# Patient Record
Sex: FEMALE | Race: WHITE | Employment: UNEMPLOYED | URBAN - NONMETROPOLITAN AREA
[De-identification: names, ages, dates, MRNs, and addresses within clinical notes are randomized per-mention and may not be internally consistent; named-entity substitution may affect disease eponyms.]

---

## 2017-09-26 ENCOUNTER — HOSPITAL ENCOUNTER (OUTPATIENT)
Age: 42
Discharge: HOME | End: 2017-09-26
Payer: COMMERCIAL

## 2017-09-26 PROCEDURE — 86480 TB TEST CELL IMMUN MEASURE: CPT

## 2017-09-29 LAB
QUANTIFERON (R) TB GOLD (INCUBATED): NEGATIVE
QUANTIFERON MITOGEN MINUS NIL: > 10 IU/ML
QUANTIFERON NIL: 0.05 IU/ML
QUANTIFERON TB AG MINUS NIL: 0 IU/ML (ref 0–0.34)

## 2019-12-13 ENCOUNTER — NURSE ONLY (OUTPATIENT)
Dept: LAB | Age: 44
End: 2019-12-13

## 2019-12-18 LAB
QUANTI TB GOLD PLUS: NEGATIVE
QUANTI TB1 MINUS NIL: 0.03 IU/ML (ref 0–0.34)
QUANTI TB2 MINUS NIL: 0.01 IU/ML (ref 0–0.34)
QUANTIFERON MITOGEN MINUS NIL: 9.27 IU/ML
QUANTIFERON NIL: 0.03 IU/ML

## 2020-10-05 ENCOUNTER — TELEPHONE (OUTPATIENT)
Dept: OBGYN CLINIC | Age: 45
End: 2020-10-05

## 2020-10-05 NOTE — TELEPHONE ENCOUNTER
Lisa calling, stating that she is not a current patient but that she is looking to to transferring. States she sees Dr. Miles Joseph in [place] and her cycles are so bad- heavy and painful, she has tried meds and it does not help. States she was scheduled for hyst when covid started, which then was cancelled. States it was rescheduled in Aug and Sept but both were cancelled due to Dr needing to cancel. It has been a few weeks since the most recent cancellation, she has tried calling and they are not returning her call to reschedule. Her PCP recommended she change providers. Scheduled her with Dr. Jenifer Desai next month for consultation.

## 2020-11-04 ENCOUNTER — OFFICE VISIT (OUTPATIENT)
Dept: OBGYN CLINIC | Age: 45
End: 2020-11-04
Payer: COMMERCIAL

## 2020-11-04 ENCOUNTER — HOSPITAL ENCOUNTER (OUTPATIENT)
Age: 45
Setting detail: SPECIMEN
Discharge: HOME | End: 2020-11-04
Payer: COMMERCIAL

## 2020-11-04 VITALS
SYSTOLIC BLOOD PRESSURE: 116 MMHG | DIASTOLIC BLOOD PRESSURE: 72 MMHG | HEIGHT: 66 IN | BODY MASS INDEX: 34.87 KG/M2 | WEIGHT: 217 LBS

## 2020-11-04 LAB
ABSOLUTE EOS #: 0.28 K/UL (ref 0–0.44)
ABSOLUTE IMMATURE GRANULOCYTE: 0.04 K/UL (ref 0–0.3)
ABSOLUTE LYMPH #: 2.23 K/UL (ref 1.1–3.7)
ABSOLUTE MONO #: 0.6 K/UL (ref 0.1–1.2)
BASOPHILS # BLD: 1 % (ref 0–2)
BASOPHILS ABSOLUTE: 0.06 K/UL (ref 0–0.2)
DIFFERENTIAL TYPE: ABNORMAL
EOSINOPHILS RELATIVE PERCENT: 4 % (ref 1–4)
HCT VFR BLD CALC: 43.2 % (ref 36.3–47.1)
HEMOGLOBIN: 14 G/DL (ref 11.9–15.1)
IMMATURE GRANULOCYTES: 1 %
LYMPHOCYTES # BLD: 32 % (ref 24–43)
MCH RBC QN AUTO: 31.3 PG (ref 25.2–33.5)
MCHC RBC AUTO-ENTMCNC: 32.4 G/DL (ref 28.4–34.8)
MCV RBC AUTO: 96.6 FL (ref 82.6–102.9)
MONOCYTES # BLD: 9 % (ref 3–12)
NRBC AUTOMATED: 0 PER 100 WBC
PDW BLD-RTO: 13.1 % (ref 11.8–14.4)
PLATELET # BLD: 251 K/UL (ref 138–453)
PLATELET ESTIMATE: ABNORMAL
PMV BLD AUTO: 12.6 FL (ref 8.1–13.5)
RBC # BLD: 4.47 M/UL (ref 3.95–5.11)
RBC # BLD: ABNORMAL 10*6/UL
SEG NEUTROPHILS: 53 % (ref 36–65)
SEGMENTED NEUTROPHILS ABSOLUTE COUNT: 3.7 K/UL (ref 1.5–8.1)
TSH SERPL DL<=0.05 MIU/L-ACNC: 0.7 MIU/L (ref 0.3–5)
WBC # BLD: 6.9 K/UL (ref 3.5–11.3)
WBC # BLD: ABNORMAL 10*3/UL

## 2020-11-04 PROCEDURE — 36415 COLL VENOUS BLD VENIPUNCTURE: CPT | Performed by: OBSTETRICS & GYNECOLOGY

## 2020-11-04 PROCEDURE — G8427 DOCREV CUR MEDS BY ELIG CLIN: HCPCS | Performed by: OBSTETRICS & GYNECOLOGY

## 2020-11-04 PROCEDURE — 4004F PT TOBACCO SCREEN RCVD TLK: CPT | Performed by: OBSTETRICS & GYNECOLOGY

## 2020-11-04 PROCEDURE — G8484 FLU IMMUNIZE NO ADMIN: HCPCS | Performed by: OBSTETRICS & GYNECOLOGY

## 2020-11-04 PROCEDURE — 99203 OFFICE O/P NEW LOW 30 MIN: CPT | Performed by: OBSTETRICS & GYNECOLOGY

## 2020-11-04 PROCEDURE — G8417 CALC BMI ABV UP PARAM F/U: HCPCS | Performed by: OBSTETRICS & GYNECOLOGY

## 2020-11-04 RX ORDER — HYDROXYZINE PAMOATE 25 MG/1
25 CAPSULE ORAL 3 TIMES DAILY PRN
COMMUNITY
End: 2021-09-16

## 2020-11-04 RX ORDER — PRAZOSIN HYDROCHLORIDE 1 MG/1
1 CAPSULE ORAL NIGHTLY
COMMUNITY
End: 2021-05-19

## 2020-11-04 RX ORDER — FLUOXETINE 10 MG/1
10 CAPSULE ORAL DAILY
COMMUNITY
End: 2020-11-20 | Stop reason: ALTCHOICE

## 2020-11-04 NOTE — PROGRESS NOTES
tsh    DATE OF VISIT:  11/4/20    PATIENT NAME:  Taryn Connors     YOB: 1975    REASON FOR VISIT:    Chief Complaint   Patient presents with    Menstrual Problem     Was scheduled 4 different times for a hyst and her previous GYN cancelled. Is a failed ablation and is miserable. Ablation was 7 years ago and then suddenly it changes and she started having very heavy bleeding and lots of pain. Her first cycle after the ablation, she bled heavy for 2 months and was changing a pad/tampon every 30-60 min.  Pelvic Pain     Having daily pelvic pain and bloating, has a hx of endometriosis, unable to have intercourse due to the severe pain. Her mother passed away 10/15. HISTORY OF PRESENT ILLNESS:  Pt had an ablation 7 years ago for heavy menses. She was to have had a hysterectomy earlier this year but it was rescheduled d/t covid - since then the physicians office had rescheduled her surgery 3 more times - the last time tellin her that he had lost his privileges at his hospital.  At that point she decided to leave his practice. Cycles have become very heavy; sometimes changing a pad every 30 min; sometimes lasting up to 2 mos long; pelvic pain is now daily - pt has hx of endometriosis; wants the hysterectomy - had previously been approved by [facility]      Patient's last menstrual period was 08/31/2020. Vitals:    11/04/20 1305   BP: 116/72   Weight: 217 lb (98.4 kg)   Height: 5' 6\" (1.676 m)     Body mass index is 35.02 kg/m².   Allergies   Allergen Reactions    Bee Venom Swelling    Ciprofloxacin Hives    Darvocet A500 [Propoxyphene N-Acetaminophen]      Hallucinate and sleep     Keflex [Cephalexin] Hives    Pcn [Penicillins] Hives    Zithromax [Azithromycin] Hives    Bactrim [Sulfamethoxazole-Trimethoprim] Hives and Rash    Oxycodone Rash     Current Outpatient Medications   Medication Sig Dispense Refill    FLUoxetine (PROZAC) 10 MG capsule Take 10 mg by mouth daily      hydrOXYzine (VISTARIL) 25 MG capsule Take 25 mg by mouth 3 times daily as needed for Itching      Fluticasone Furoate 50 MCG/ACT AEPB Inhale into the lungs      prazosin (MINIPRESS) 1 MG capsule Take 1 mg by mouth nightly      buPROPion (WELLBUTRIN XL) 300 MG XL tablet Take 300 mg by mouth every morning.  Elastic Bandages & Supports (ADJUSTABLE WRIST BRACE) MISC Wear braces at night. Dx .0 2 each 0    albuterol (PROVENTIL HFA;VENTOLIN HFA) 108 (90 BASE) MCG/ACT inhaler Inhale 2 puffs into the lungs 4 times daily as needed.  cetirizine (ZYRTEC) 10 MG tablet Take 10 mg by mouth daily. No current facility-administered medications for this visit.       Social History     Socioeconomic History    Marital status:      Spouse name: None    Number of children: None    Years of education: None    Highest education level: None   Occupational History    None   Social Needs    Financial resource strain: None    Food insecurity     Worry: None     Inability: None    Transportation needs     Medical: None     Non-medical: None   Tobacco Use    Smoking status: Current Every Day Smoker     Packs/day: 1.00     Years: 10.00     Pack years: 10.00    Smokeless tobacco: Never Used   Substance and Sexual Activity    Alcohol use: Yes     Comment: rarely    Drug use: None    Sexual activity: Not Currently   Lifestyle    Physical activity     Days per week: None     Minutes per session: None    Stress: None   Relationships    Social connections     Talks on phone: None     Gets together: None     Attends Sabianist service: None     Active member of club or organization: None     Attends meetings of clubs or organizations: None     Relationship status: None    Intimate partner violence     Fear of current or ex partner: None     Emotionally abused: None     Physically abused: None     Forced sexual activity: None   Other Topics Concern    None   Social History Narrative    None REVIEW OF SYSTEMS:  Review of Systems   Genitourinary: Positive for dyspareunia, menstrual problem and pelvic pain. PHYSICAL EXAM:  /72   Ht 5' 6\" (1.676 m)   Wt 217 lb (98.4 kg)   LMP 08/31/2020   BMI 35.02 kg/m²   Physical Exam  Constitutional:       Appearance: Normal appearance. HENT:      Head: Normocephalic and atraumatic. Eyes:      Extraocular Movements: Extraocular movements intact. Pupils: Pupils are equal, round, and reactive to light. Neck:      Musculoskeletal: Normal range of motion. Cardiovascular:      Rate and Rhythm: Normal rate. Pulmonary:      Effort: Pulmonary effort is normal.   Musculoskeletal: Normal range of motion. Neurological:      Mental Status: She is alert and oriented to person, place, and time. Skin:     General: Skin is dry. Psychiatric:         Mood and Affect: Mood normal.         Behavior: Behavior normal.         Thought Content: Thought content normal.         Judgment: Judgment normal.         ASSESSMENT:      1. Family history of lupus anticoagulant disorder    2. Menorrhagia with irregular cycle    3. Endometriosis determined by laparoscopy    4. Dysmenorrhea    5. Dyspareunia in female    6. Adenomyosis    7. Fatigue, unspecified type        PLAN:  Orders Placed This Encounter   Procedures    Lupus Anticoagulant    TSH With Reflex Ft4    CBC Auto Differential     Return in about 4 weeks (around 12/2/2020) for BA to coordinate surgery, follow up. Pt aware that we will want her lupus anticoag status before surgery if possible. BA can work on coordinating her surgery if she wishes to proceed.     Electronically signed by oJ Talamantes DO on 11/19/20

## 2020-11-04 NOTE — Clinical Note
I think I gave you this chart already - she wants hyst - being worked up for [address] that she told me runs in her fam

## 2020-11-06 LAB
ANTICARDIOLIPIN IGA ANTIBODY: 2.3 APU
ANTICARDIOLIPIN IGG ANTIBODY: 1.9 GPU
CARDIOLIPIN AB IGM: 24 MPU
DILUTE RUSSELL VIPER VENOM TIME: ABNORMAL
INR BLD: 0.9
LUPUS ANTICOAG: ABNORMAL
PARTIAL THROMBOPLASTIN TIME: 26.7 SEC (ref 20.5–30.5)
PROTHROMBIN TIME: 9.6 SEC (ref 9–12)

## 2020-11-12 ENCOUNTER — TELEPHONE (OUTPATIENT)
Dept: ONCOLOGY | Age: 45
End: 2020-11-12

## 2020-11-12 NOTE — RESULT ENCOUNTER NOTE
Left a message on patient's voicemail of her results and recommendations. Referral to heme entered into Epic, they will call the patient to schedule. Patient to call with any questions/concerns.

## 2020-11-12 NOTE — TELEPHONE ENCOUNTER
Received internal referral from [facility] for pt to see Dr. Selena De Los Santos for Z83.2 (ICD-10-CM) - Family history of lupus anticoagulant disorder   N92.1 (ICD-10-CM) - Menorrhagia with irregular cycle   R53.83 (ICD-10-CM) - Fatigue, unspecified type. LM for pt to call back and schedule consult. Referral is in deferred work-q.

## 2020-11-20 ENCOUNTER — INITIAL CONSULT (OUTPATIENT)
Dept: ONCOLOGY | Age: 45
End: 2020-11-20
Payer: COMMERCIAL

## 2020-11-20 ENCOUNTER — TELEPHONE (OUTPATIENT)
Dept: ONCOLOGY | Age: 45
End: 2020-11-20

## 2020-11-20 VITALS
SYSTOLIC BLOOD PRESSURE: 116 MMHG | HEART RATE: 95 BPM | BODY MASS INDEX: 34.58 KG/M2 | RESPIRATION RATE: 16 BRPM | WEIGHT: 215.2 LBS | DIASTOLIC BLOOD PRESSURE: 76 MMHG | HEIGHT: 66 IN | TEMPERATURE: 98.1 F

## 2020-11-20 PROBLEM — D69.9 BLEEDING TENDENCY (HCC): Status: ACTIVE | Noted: 2020-11-20

## 2020-11-20 PROBLEM — R23.3 EASY BRUISING: Status: ACTIVE | Noted: 2020-11-20

## 2020-11-20 PROBLEM — Z83.2 FAMILY HISTORY OF LUPUS ANTICOAGULANT DISORDER: Status: ACTIVE | Noted: 2020-11-20

## 2020-11-20 PROCEDURE — G8417 CALC BMI ABV UP PARAM F/U: HCPCS | Performed by: INTERNAL MEDICINE

## 2020-11-20 PROCEDURE — G8427 DOCREV CUR MEDS BY ELIG CLIN: HCPCS | Performed by: INTERNAL MEDICINE

## 2020-11-20 PROCEDURE — 99201 HC NEW PT, E/M LEVEL 1: CPT | Performed by: INTERNAL MEDICINE

## 2020-11-20 PROCEDURE — 99245 OFF/OP CONSLTJ NEW/EST HI 55: CPT | Performed by: INTERNAL MEDICINE

## 2020-11-20 PROCEDURE — G8484 FLU IMMUNIZE NO ADMIN: HCPCS | Performed by: INTERNAL MEDICINE

## 2020-11-20 RX ORDER — ESCITALOPRAM OXALATE 10 MG/1
10 TABLET ORAL DAILY
COMMUNITY
End: 2020-12-30 | Stop reason: ALTCHOICE

## 2020-11-20 NOTE — PROGRESS NOTES
_               Ms. Taryn Connors is a very pleasant 39 y.o. female with history of multiple co morbidities as listed. The patient is referred for further evaluation of abnormal lupus anticoagulant prior to planned hysterectomy in January 2021. The patient had family history of lupus anticoagulant. Patient's mother had multiple episodes of thrombosis and she was evaluated and found to have abnormal lupus anticoagulant. Patient herself was tested in 2014 for that disorder and she was normal.  Repeated test November 4, 2020 showed low positive IgM lupus anticoagulant. The patient has history of psoriasis and she is maintained on immunotherapy treatment for the last 4 years. This is controlled. The patient had problem with easy bruising for long duration. She is having heavy menstrual periods with passage of clots. Uterine ablation was attempted but failed. Plan for the patient to have hysterectomy in January. Currently patient has no other source of active bleeding. No GI bleeding. No hematuria. No nosebleed or gum bleed. No fever or infections. No chest pain or shortness of breath. No other complaints. Patient is a smoker on and off. Denies alcohol drinking. PAST MEDICAL HISTORY: has a past medical history of Active smoker, Anxiety, Asthma, Asthma, Balance problem, Bronchitis, Cervical radicular pain, CTS (carpal tunnel syndrome), Depressive disorder, Fall at home, Headache, Migraine, Multiple allergies, Neck pain, Peripheral neuropathy, Pneumonia, and Psoriasis. PAST SURGICAL HISTORY: has a past surgical history that includes Tonsillectomy; Tubal ligation; Cervix lesion destruction; and Tympanostomy tube placement.      CURRENT MEDICATIONS:  has a current medication list which includes the following prescription(s): escitalopram, fluticasone furoate, prazosin, albuterol sulfate hfa, cetirizine, immunotherapy  Low positive IgM lupus anticoagulant. Mother with history of lupus anticoagulant  Tobacco abuse    PLAN: For more than 60 minutes of face to face discussion, I explained to the patient the nature of this problem with bleeding tendency and possible underlying cause. I explained the nature of lupus anticoagulant and possibility of false positive results on the blood testing. I explained the need to confirm diagnosis with repeated lab testing. Lupus anticoagulant test 2014 was normal.  Repeated November 4, 2020 shows low positive for IgM lupus anticoagulant. It will be repeated in about 6 weeks. I am concerned about patient's history with bleeding tendency and easy bruising and previous history of bleeding after carpal tunnel surgery. We will check platelet function test and repeat CBC along with ALDO with reflex in mid December along with repeated lupus anticoagulant. We will make further recommendations in regard to management prior to her planned surgery in January after reviewing the results of this test.  Patient's questions were answered to the best of her satisfaction and she verbalized full understanding and agreement.

## 2020-11-20 NOTE — LETTER
Tonsillectomy; Tubal ligation; Cervix lesion destruction; and Tympanostomy tube placement. CURRENT MEDICATIONS:  has a current medication list which includes the following prescription(s): escitalopram, fluticasone furoate, prazosin, albuterol sulfate hfa, cetirizine, hydroxyzine, and adjustable wrist brace. ALLERGIES:  is allergic to bee venom; ciprofloxacin; darvocet a500 [propoxyphene n-acetaminophen]; keflex [cephalexin]; pcn [penicillins]; zithromax [azithromycin]; bactrim [sulfamethoxazole-trimethoprim]; and oxycodone. FAMILY HISTORY: Father had multiple thrombosis events and lupus anticoagulant. She had multiple aunts and her father's side with lung cancer and colon cancer and breast cancer. Otherwise negative for any hematological or oncological conditions. SOCIAL HISTORY:  reports that she has been smoking. She has a 10.00 pack-year smoking history. She has never used smokeless tobacco. She reports current alcohol use. REVIEW OF SYSTEMS:     · General: Mild weakness but no fatigue. No unanticipated weight loss or decreased appetite. No fever or chills. · Eyes: No blurred vision, eye pain or double vision. · Ears: No hearing problems or drainage. No tinnitus. · Throat: No sore throat, problems with swallowing or dysphagia. · Respiratory: No cough, sputum or hemoptysis. No shortness of breath. No pleuritic chest pain. · Cardiovascular: No chest pain, orthopnea or PND. No lower extremity edema. No palpitation. · Gastrointestinal: No problems with swallowing. No abdominal pain or bloating. No nausea or vomiting. No diarrhea or constipation. No GI bleeding. · Genitourinary: No dysuria, hematuria, frequency or urgency. · Musculoskeletal: No muscle aches or pains. No limitation of movement. No back pain. No gait disturbance, No joint complaints. · Dermatologic: No skin rashes or pruritus. No skin lesions or discolorations. · Psychiatric: No depression, anxiety, or stress or signs of schizophrenia. No change in mood or affect. · Hematologic: As above. · Infectious disease: No fever, chills or frequent infections. · Endocrine: No polydipsia or polyuria. No temperature intolerance. · Neurologic: No headaches or dizziness. No weakness or numbness of the extremities. No changes in balance, coordination,  memory, mentation, behavior. · Allergic/Immunologic: No nasal congestion or hives. No repeated infections. PHYSICAL EXAM:  The patient is not in acute distress. Vital signs: Blood pressure 116/76, pulse 95, temperature 98.1 °F (36.7 °C), temperature source Oral, resp. rate 16, height 5' 6\" (1.676 m), weight 215 lb 3.2 oz (97.6 kg).      General appearance - well appearing, not in pain or distress  Mental status - good mood, alert and oriented  Eyes - pupils equal and reactive, extraocular eye movements intact  Ears - bilateral TM's and external ear canals normal  Nose - normal and patent, no erythema, discharge or polyps  Mouth - mucous membranes moist, pharynx normal without lesions  Neck - supple, no significant adenopathy  Lymphatics - no palpable lymphadenopathy, no hepatosplenomegaly  Chest - clear to auscultation, no wheezes, rales or rhonchi, symmetric air entry  Heart - normal rate, regular rhythm, normal S1, S2, no murmurs, rubs, clicks or gallops  Abdomen - soft, nontender, nondistended, no masses or organomegaly  Neurological - alert, oriented, normal speech, no focal findings or movement disorder noted  Musculoskeletal - no joint tenderness, deformity or swelling  Extremities - peripheral pulses normal, no pedal edema, no clubbing or cyanosis  Skin - normal coloration and turgor, no rashes, no suspicious skin lesions noted     Review of Diagnostic data:   Lab Results   Component Value Date    WBC 6.9 11/04/2020    HGB 14.0 11/04/2020    HCT 43.2 11/04/2020    MCV 96.6 11/04/2020     11/04/2020 Chemistry        Component Value Date/Time    BUN 11 06/27/2017 1129    CREATININE 0.7 06/27/2017 1129        Component Value Date/Time    AST 33 06/27/2017 1129    ALT 65 06/27/2017 1129            IMPRESSION:   Bleeding tendency. Easy bruising. Menorrhagia with need for hysterectomy in January 2021. Psoriatic arthritis on immunotherapy  Low positive IgM lupus anticoagulant. Mother with history of lupus anticoagulant  Tobacco abuse    PLAN: For more than 60 minutes of face to face discussion, I explained to the patient the nature of this problem with bleeding tendency and possible underlying cause. I explained the nature of lupus anticoagulant and possibility of false positive results on the blood testing. I explained the need to confirm diagnosis with repeated lab testing. Lupus anticoagulant test 2014 was normal.  Repeated November 4, 2020 shows low positive for IgM lupus anticoagulant. It will be repeated in about 6 weeks. I am concerned about patient's history with bleeding tendency and easy bruising and previous history of bleeding after carpal tunnel surgery. We will check platelet function test and repeat CBC along with ALDO with reflex in mid December along with repeated lupus anticoagulant. We will make further recommendations in regard to management prior to her planned surgery in January after reviewing the results of this test.  Patient's questions were answered to the best of her satisfaction and she verbalized full understanding and agreement. If you have questions, please do not hesitate to call me. I look forward to following Jodi Amaya along with you.     Sincerely,                            [facility]                              This note is created with the assistance of a speech recognition program.  While intending to generate a document that actually reflects the content of the visit, the document can still have some errors including those of syntax and sound a like substitutions which may escape proof reading. It such instances, actual meaning can be extrapolated by contextual diversion.

## 2020-11-30 NOTE — PROGRESS NOTES
Patient is scheduled for a RA TLH, monica HOPPER, monica Rome at [facility] on 1/21/21. She is aware that I will have a nurse call her to complete a phone interview and arrange COVID testing. She will come in to see Dr. Joanne Downs prior to surgery and will get labs at that visit. We will also follow up 2 and 6 weeks after surgery. Patient will call back to schedule.

## 2020-12-30 RX ORDER — PAROXETINE 10 MG/1
10 TABLET, FILM COATED ORAL NIGHTLY
COMMUNITY
End: 2021-01-20

## 2020-12-30 NOTE — PROGRESS NOTES
Patient instructed on the pre-operative, intra-operative, and post-operative process. Patient instructed on NPO status. Medication instructions and Pre operative instruction sheet reviewed over the phone. Instructed pt to take hydroxyzine with a small sip if water prior to arriving to the hospital the day of surgery if needed. Pt is having covid testing done at [facility].

## 2020-12-31 ENCOUNTER — PRE-PROCEDURE TELEPHONE (OUTPATIENT)
Dept: PREADMISSION TESTING | Age: 45
End: 2020-12-31

## 2020-12-31 NOTE — TELEPHONE ENCOUNTER
Spoke with patient and confirmed a covid swab appt for Jan 6th at 1100. Instructions provided, verbalizes understanding.

## 2021-01-06 ENCOUNTER — HOSPITAL ENCOUNTER (OUTPATIENT)
Dept: PREADMISSION TESTING | Age: 46
Setting detail: SPECIMEN
Discharge: HOME | End: 2021-01-10
Payer: COMMERCIAL

## 2021-01-06 ENCOUNTER — HOSPITAL ENCOUNTER (OUTPATIENT)
Age: 46
Setting detail: SPECIMEN
Discharge: HOME | End: 2021-01-06
Payer: COMMERCIAL

## 2021-01-06 ENCOUNTER — OFFICE VISIT (OUTPATIENT)
Dept: OBGYN CLINIC | Age: 46
End: 2021-01-06
Payer: COMMERCIAL

## 2021-01-06 VITALS
SYSTOLIC BLOOD PRESSURE: 102 MMHG | DIASTOLIC BLOOD PRESSURE: 70 MMHG | BODY MASS INDEX: 35.2 KG/M2 | HEIGHT: 66 IN | WEIGHT: 219 LBS

## 2021-01-06 DIAGNOSIS — N94.6 DYSMENORRHEA: ICD-10-CM

## 2021-01-06 DIAGNOSIS — N92.1 MENORRHAGIA WITH IRREGULAR CYCLE: Primary | ICD-10-CM

## 2021-01-06 DIAGNOSIS — N80.03 ADENOMYOSIS: ICD-10-CM

## 2021-01-06 DIAGNOSIS — N80.9 ENDOMETRIOSIS DETERMINED BY LAPAROSCOPY: ICD-10-CM

## 2021-01-06 DIAGNOSIS — Z11.59 ENCOUNTER FOR SCREENING FOR OTHER VIRAL DISEASES: ICD-10-CM

## 2021-01-06 DIAGNOSIS — N92.1 MENORRHAGIA WITH IRREGULAR CYCLE: ICD-10-CM

## 2021-01-06 LAB
ABSOLUTE EOS #: 0.43 K/UL (ref 0–0.44)
ABSOLUTE IMMATURE GRANULOCYTE: 0.04 K/UL (ref 0–0.3)
ABSOLUTE LYMPH #: 2.39 K/UL (ref 1.1–3.7)
ABSOLUTE MONO #: 0.55 K/UL (ref 0.1–1.2)
BASOPHILS # BLD: 1 % (ref 0–2)
BASOPHILS ABSOLUTE: 0.07 K/UL (ref 0–0.2)
DIFFERENTIAL TYPE: ABNORMAL
EOSINOPHILS RELATIVE PERCENT: 6 % (ref 1–4)
HCG QUALITATIVE: NEGATIVE
HCT VFR BLD CALC: 43.3 % (ref 36.3–47.1)
HEMOGLOBIN: 14.2 G/DL (ref 11.9–15.1)
IMMATURE GRANULOCYTES: 1 %
LYMPHOCYTES # BLD: 34 % (ref 24–43)
MCH RBC QN AUTO: 31.3 PG (ref 25.2–33.5)
MCHC RBC AUTO-ENTMCNC: 32.8 G/DL (ref 28.4–34.8)
MCV RBC AUTO: 95.6 FL (ref 82.6–102.9)
MONOCYTES # BLD: 8 % (ref 3–12)
NRBC AUTOMATED: 0 PER 100 WBC
PDW BLD-RTO: 13.2 % (ref 11.8–14.4)
PLATELET # BLD: 242 K/UL (ref 138–453)
PLATELET ESTIMATE: ABNORMAL
PMV BLD AUTO: 12.8 FL (ref 8.1–13.5)
RBC # BLD: 4.53 M/UL (ref 3.95–5.11)
RBC # BLD: ABNORMAL 10*6/UL
SEG NEUTROPHILS: 50 % (ref 36–65)
SEGMENTED NEUTROPHILS ABSOLUTE COUNT: 3.56 K/UL (ref 1.5–8.1)
WBC # BLD: 7 K/UL (ref 3.5–11.3)
WBC # BLD: ABNORMAL 10*3/UL

## 2021-01-06 PROCEDURE — 99213 OFFICE O/P EST LOW 20 MIN: CPT | Performed by: OBSTETRICS & GYNECOLOGY

## 2021-01-06 PROCEDURE — 4004F PT TOBACCO SCREEN RCVD TLK: CPT | Performed by: OBSTETRICS & GYNECOLOGY

## 2021-01-06 PROCEDURE — G8417 CALC BMI ABV UP PARAM F/U: HCPCS | Performed by: OBSTETRICS & GYNECOLOGY

## 2021-01-06 PROCEDURE — G8427 DOCREV CUR MEDS BY ELIG CLIN: HCPCS | Performed by: OBSTETRICS & GYNECOLOGY

## 2021-01-06 PROCEDURE — G8484 FLU IMMUNIZE NO ADMIN: HCPCS | Performed by: OBSTETRICS & GYNECOLOGY

## 2021-01-06 PROCEDURE — U0003 INFECTIOUS AGENT DETECTION BY NUCLEIC ACID (DNA OR RNA); SEVERE ACUTE RESPIRATORY SYNDROME CORONAVIRUS 2 (SARS-COV-2) (CORONAVIRUS DISEASE [COVID-19]), AMPLIFIED PROBE TECHNIQUE, MAKING USE OF HIGH THROUGHPUT TECHNOLOGIES AS DESCRIBED BY CMS-2020-01-R: HCPCS

## 2021-01-06 RX ORDER — ESOMEPRAZOLE MAGNESIUM 40 MG/1
CAPSULE, DELAYED RELEASE ORAL
COMMUNITY
Start: 2020-12-02

## 2021-01-06 RX ORDER — TOPIRAMATE 25 MG/1
TABLET ORAL
COMMUNITY
Start: 2020-12-22 | End: 2021-05-19

## 2021-01-06 RX ORDER — SECUKINUMAB 150 MG/ML
INJECTION SUBCUTANEOUS
COMMUNITY
Start: 2020-12-28 | End: 2021-05-19

## 2021-01-06 NOTE — PROGRESS NOTES
DATE OF VISIT:  1/6/21    PATIENT NAME:  Jason Duran     YOB: 1975    REASON FOR VISIT:    Chief Complaint   Patient presents with    Pre-op Exam    Menstrual Problem     heavy periods and painful periods. Sometimes changes a pad every 20-30 minutes. has bled for up to 2 months at a time. Has had ablation in the past. Cycles are very irregular. If she is not bleeding she feels very bloated and has pelvic pain. rates pain 9 out of 10 on pain scale. Has days where she wants to curl up and not move. HISTORY OF PRESENT ILLNESS:  States that she saw heme and they were more worried about inc risk of bleeding v clotting; will dose with usu lovenox; pt continues to have heavy and painful menses despite ablation; r/b/a reviewed and consent obtained; pt wants to have ovaries removed - aware that this means she will be menopausal but has fam hx of problems with ovaries and doesn't want to need more surgery      No LMP recorded. Patient has had an ablation. Vitals:    01/06/21 1416   BP: 102/70   Site: Right Upper Arm   Position: Sitting   Weight: 219 lb (99.3 kg)   Height: 5' 6\" (1.676 m)     Body mass index is 35.35 kg/m².   Allergies   Allergen Reactions    Dextromethorphan-Guaifenesin Rash     Other reaction(s): Dizziness    Aleve [Naproxen] Hives     Gets hives from Aleve and Excedrin migraine or any migraine medications but can take other naproxen products    Bee Venom Swelling    Ciprofloxacin Hives    Darvocet A500 [Propoxyphene N-Acetaminophen]      Hallucinate and sleep     Keflex [Cephalexin] Hives    Pcn [Penicillins] Hives    Zithromax [Azithromycin] Hives    Bactrim [Sulfamethoxazole-Trimethoprim] Hives and Rash    Oxycodone Rash     Current Outpatient Medications   Medication Sig Dispense Refill    esomeprazole (NEXIUM) 40 MG delayed release capsule Take 1 capsule by mouth once daily  topiramate (TOPAMAX) 25 MG tablet TAKE 1 TABLET BY MOUTH IN THE MORNING AND 2 IN THE EVENING      COSENTYX, 300 MG DOSE, 150 MG/ML SOSY       PARoxetine (PAXIL) 10 MG tablet Take 10 mg by mouth nightly      hydrOXYzine (VISTARIL) 25 MG capsule Take 25 mg by mouth 3 times daily as needed for Itching      Fluticasone Furoate 50 MCG/ACT AEPB Inhale into the lungs      albuterol (PROVENTIL HFA;VENTOLIN HFA) 108 (90 BASE) MCG/ACT inhaler Inhale 2 puffs into the lungs 4 times daily as needed.  cetirizine (ZYRTEC) 10 MG tablet Take 10 mg by mouth daily.  prazosin (MINIPRESS) 1 MG capsule Take 1 mg by mouth nightly      Elastic Bandages & Supports (ADJUSTABLE WRIST BRACE) MISC Wear braces at night. Dx .0 2 each 0     No current facility-administered medications for this visit.       Social History     Socioeconomic History    Marital status:      Spouse name: Not on file    Number of children: Not on file    Years of education: Not on file    Highest education level: Not on file   Occupational History    Not on file   Social Needs    Financial resource strain: Not on file    Food insecurity     Worry: Not on file     Inability: Not on file    Transportation needs     Medical: Not on file     Non-medical: Not on file   Tobacco Use    Smoking status: Current Every Day Smoker     Packs/day: 1.00     Years: 10.00     Pack years: 10.00    Smokeless tobacco: Never Used   Substance and Sexual Activity    Alcohol use: Yes     Comment: rarely    Drug use: Never    Sexual activity: Not Currently   Lifestyle    Physical activity     Days per week: Not on file     Minutes per session: Not on file    Stress: Not on file   Relationships    Social connections     Talks on phone: Not on file     Gets together: Not on file     Attends Christian service: Not on file     Active member of club or organization: Not on file     Attends meetings of clubs or organizations: Not on file Relationship status: Not on file    Intimate partner violence     Fear of current or ex partner: Not on file     Emotionally abused: Not on file     Physically abused: Not on file     Forced sexual activity: Not on file   Other Topics Concern    Not on file   Social History Narrative    Not on file       REVIEW OF SYSTEMS:  Review of Systems   Constitutional: Negative for chills and fever. Genitourinary: Positive for menstrual problem and pelvic pain. Negative for dysuria. PHYSICAL EXAM:  /70 (Site: Right Upper Arm, Position: Sitting)   Ht 5' 6\" (1.676 m)   Wt 219 lb (99.3 kg)   BMI 35.35 kg/m²   Physical Exam  Constitutional:       Appearance: Normal appearance. HENT:      Head: Normocephalic and atraumatic. Eyes:      Extraocular Movements: Extraocular movements intact. Pupils: Pupils are equal, round, and reactive to light. Neck:      Musculoskeletal: Normal range of motion. Cardiovascular:      Rate and Rhythm: Normal rate. Pulmonary:      Effort: Pulmonary effort is normal.   Musculoskeletal: Normal range of motion. Neurological:      Mental Status: She is alert and oriented to person, place, and time. Skin:     General: Skin is warm and dry. Psychiatric:         Mood and Affect: Mood normal.         Behavior: Behavior normal.         Thought Content: Thought content normal.         Judgment: Judgment normal.         ASSESSMENT:    1. Menorrhagia with irregular cycle    2. Endometriosis determined by laparoscopy    3. Dysmenorrhea    4. Adenomyosis        PLAN:  No orders of the defined types were placed in this encounter. No follow-ups on file.        Electronically signed by Kam Rivera DO on 01/06/21

## 2021-01-08 ENCOUNTER — ANESTHESIA EVENT (OUTPATIENT)
Dept: OPERATING ROOM | Age: 46
End: 2021-01-08
Payer: COMMERCIAL

## 2021-01-08 LAB — SARS-COV-2, NAA: NOT DETECTED

## 2021-01-09 ENCOUNTER — TELEPHONE (OUTPATIENT)
Dept: PRIMARY CARE CLINIC | Age: 46
End: 2021-01-09

## 2021-01-11 ENCOUNTER — HOSPITAL ENCOUNTER (OUTPATIENT)
Age: 46
Setting detail: OUTPATIENT SURGERY
Discharge: HOME | End: 2021-01-11
Attending: OBSTETRICS & GYNECOLOGY | Admitting: OBSTETRICS & GYNECOLOGY
Payer: COMMERCIAL

## 2021-01-11 ENCOUNTER — ANESTHESIA (OUTPATIENT)
Dept: OPERATING ROOM | Age: 46
End: 2021-01-11
Payer: COMMERCIAL

## 2021-01-11 VITALS — SYSTOLIC BLOOD PRESSURE: 125 MMHG | OXYGEN SATURATION: 96 % | DIASTOLIC BLOOD PRESSURE: 105 MMHG

## 2021-01-11 VITALS
RESPIRATION RATE: 16 BRPM | OXYGEN SATURATION: 99 % | HEART RATE: 63 BPM | WEIGHT: 212 LBS | TEMPERATURE: 98.4 F | DIASTOLIC BLOOD PRESSURE: 65 MMHG | SYSTOLIC BLOOD PRESSURE: 118 MMHG | BODY MASS INDEX: 34.07 KG/M2 | HEIGHT: 66 IN

## 2021-01-11 DIAGNOSIS — G89.18 POST-OP PAIN: Primary | ICD-10-CM

## 2021-01-11 PROCEDURE — 6370000000 HC RX 637 (ALT 250 FOR IP): Performed by: OBSTETRICS & GYNECOLOGY

## 2021-01-11 PROCEDURE — 88305 TISSUE EXAM BY PATHOLOGIST: CPT

## 2021-01-11 PROCEDURE — 2580000003 HC RX 258: Performed by: OBSTETRICS & GYNECOLOGY

## 2021-01-11 PROCEDURE — 7100000001 HC PACU RECOVERY - ADDTL 15 MIN: Performed by: OBSTETRICS & GYNECOLOGY

## 2021-01-11 PROCEDURE — 7100000000 HC PACU RECOVERY - FIRST 15 MIN: Performed by: OBSTETRICS & GYNECOLOGY

## 2021-01-11 PROCEDURE — 2500000003 HC RX 250 WO HCPCS: Performed by: NURSE ANESTHETIST, CERTIFIED REGISTERED

## 2021-01-11 PROCEDURE — 2500000003 HC RX 250 WO HCPCS: Performed by: OBSTETRICS & GYNECOLOGY

## 2021-01-11 PROCEDURE — 7100000010 HC PHASE II RECOVERY - FIRST 15 MIN: Performed by: OBSTETRICS & GYNECOLOGY

## 2021-01-11 PROCEDURE — 3700000001 HC ADD 15 MINUTES (ANESTHESIA): Performed by: OBSTETRICS & GYNECOLOGY

## 2021-01-11 PROCEDURE — S2900 ROBOTIC SURGICAL SYSTEM: HCPCS | Performed by: OBSTETRICS & GYNECOLOGY

## 2021-01-11 PROCEDURE — 7100000011 HC PHASE II RECOVERY - ADDTL 15 MIN: Performed by: OBSTETRICS & GYNECOLOGY

## 2021-01-11 PROCEDURE — 3700000000 HC ANESTHESIA ATTENDED CARE: Performed by: OBSTETRICS & GYNECOLOGY

## 2021-01-11 PROCEDURE — 2720000010 HC SURG SUPPLY STERILE: Performed by: OBSTETRICS & GYNECOLOGY

## 2021-01-11 PROCEDURE — 3600000019 HC SURGERY ROBOT ADDTL 15MIN: Performed by: OBSTETRICS & GYNECOLOGY

## 2021-01-11 PROCEDURE — 3600000009 HC SURGERY ROBOT BASE: Performed by: OBSTETRICS & GYNECOLOGY

## 2021-01-11 PROCEDURE — 6360000002 HC RX W HCPCS: Performed by: NURSE ANESTHETIST, CERTIFIED REGISTERED

## 2021-01-11 PROCEDURE — 58571 TLH W/T/O 250 G OR LESS: CPT | Performed by: OBSTETRICS & GYNECOLOGY

## 2021-01-11 PROCEDURE — 2709999900 HC NON-CHARGEABLE SUPPLY: Performed by: OBSTETRICS & GYNECOLOGY

## 2021-01-11 PROCEDURE — 6370000000 HC RX 637 (ALT 250 FOR IP): Performed by: NURSE ANESTHETIST, CERTIFIED REGISTERED

## 2021-01-11 PROCEDURE — 64488 TAP BLOCK BI INJECTION: CPT | Performed by: NURSE ANESTHETIST, CERTIFIED REGISTERED

## 2021-01-11 PROCEDURE — 88307 TISSUE EXAM BY PATHOLOGIST: CPT

## 2021-01-11 PROCEDURE — 6360000002 HC RX W HCPCS: Performed by: OBSTETRICS & GYNECOLOGY

## 2021-01-11 RX ORDER — PROMETHAZINE HYDROCHLORIDE 25 MG/ML
6.25 INJECTION, SOLUTION INTRAMUSCULAR; INTRAVENOUS
Status: DISCONTINUED | OUTPATIENT
Start: 2021-01-11 | End: 2021-01-11 | Stop reason: HOSPADM

## 2021-01-11 RX ORDER — IBUPROFEN 800 MG/1
800 TABLET ORAL EVERY 8 HOURS PRN
Qty: 20 TABLET | Refills: 0 | Status: SHIPPED | OUTPATIENT
Start: 2021-01-11 | End: 2021-06-30

## 2021-01-11 RX ORDER — LABETALOL HYDROCHLORIDE 5 MG/ML
5 INJECTION, SOLUTION INTRAVENOUS EVERY 10 MIN PRN
Status: DISCONTINUED | OUTPATIENT
Start: 2021-01-11 | End: 2021-01-11 | Stop reason: HOSPADM

## 2021-01-11 RX ORDER — LIDOCAINE HYDROCHLORIDE 20 MG/ML
INJECTION, SOLUTION EPIDURAL; INFILTRATION; INTRACAUDAL; PERINEURAL PRN
Status: DISCONTINUED | OUTPATIENT
Start: 2021-01-11 | End: 2021-01-11 | Stop reason: SDUPTHER

## 2021-01-11 RX ORDER — ONDANSETRON 2 MG/ML
INJECTION INTRAMUSCULAR; INTRAVENOUS PRN
Status: DISCONTINUED | OUTPATIENT
Start: 2021-01-11 | End: 2021-01-11 | Stop reason: SDUPTHER

## 2021-01-11 RX ORDER — OXYCODONE HYDROCHLORIDE AND ACETAMINOPHEN 5; 325 MG/1; MG/1
1 TABLET ORAL PRN
Status: COMPLETED | OUTPATIENT
Start: 2021-01-11 | End: 2021-01-11

## 2021-01-11 RX ORDER — CLINDAMYCIN PHOSPHATE 900 MG/50ML
900 INJECTION INTRAVENOUS
Status: COMPLETED | OUTPATIENT
Start: 2021-01-11 | End: 2021-01-11

## 2021-01-11 RX ORDER — SODIUM CHLORIDE 0.9 % (FLUSH) 0.9 %
10 SYRINGE (ML) INJECTION PRN
Status: DISCONTINUED | OUTPATIENT
Start: 2021-01-11 | End: 2021-01-11 | Stop reason: HOSPADM

## 2021-01-11 RX ORDER — HYDRALAZINE HYDROCHLORIDE 20 MG/ML
5 INJECTION INTRAMUSCULAR; INTRAVENOUS EVERY 10 MIN PRN
Status: DISCONTINUED | OUTPATIENT
Start: 2021-01-11 | End: 2021-01-11 | Stop reason: HOSPADM

## 2021-01-11 RX ORDER — KETOROLAC TROMETHAMINE 30 MG/ML
INJECTION, SOLUTION INTRAMUSCULAR; INTRAVENOUS PRN
Status: DISCONTINUED | OUTPATIENT
Start: 2021-01-11 | End: 2021-01-11 | Stop reason: SDUPTHER

## 2021-01-11 RX ORDER — OXYCODONE HYDROCHLORIDE AND ACETAMINOPHEN 5; 325 MG/1; MG/1
2 TABLET ORAL PRN
Status: COMPLETED | OUTPATIENT
Start: 2021-01-11 | End: 2021-01-11

## 2021-01-11 RX ORDER — DEXAMETHASONE SODIUM PHOSPHATE 10 MG/ML
INJECTION INTRAMUSCULAR; INTRAVENOUS PRN
Status: DISCONTINUED | OUTPATIENT
Start: 2021-01-11 | End: 2021-01-11 | Stop reason: SDUPTHER

## 2021-01-11 RX ORDER — DEXAMETHASONE SODIUM PHOSPHATE 4 MG/ML
INJECTION, SOLUTION INTRA-ARTICULAR; INTRALESIONAL; INTRAMUSCULAR; INTRAVENOUS; SOFT TISSUE PRN
Status: DISCONTINUED | OUTPATIENT
Start: 2021-01-11 | End: 2021-01-11 | Stop reason: SDUPTHER

## 2021-01-11 RX ORDER — FENTANYL CITRATE 50 UG/ML
25 INJECTION, SOLUTION INTRAMUSCULAR; INTRAVENOUS EVERY 5 MIN PRN
Status: DISCONTINUED | OUTPATIENT
Start: 2021-01-11 | End: 2021-01-11 | Stop reason: HOSPADM

## 2021-01-11 RX ORDER — ATROPA BELLADONNA AND OPIUM 16.2; 3 MG/1; MG/1
SUPPOSITORY RECTAL PRN
Status: DISCONTINUED | OUTPATIENT
Start: 2021-01-11 | End: 2021-01-11 | Stop reason: SDUPTHER

## 2021-01-11 RX ORDER — DIMENHYDRINATE 50 MG/1
50 TABLET ORAL ONCE
Status: COMPLETED | OUTPATIENT
Start: 2021-01-11 | End: 2021-01-11

## 2021-01-11 RX ORDER — METOCLOPRAMIDE HYDROCHLORIDE 5 MG/ML
10 INJECTION INTRAMUSCULAR; INTRAVENOUS
Status: DISCONTINUED | OUTPATIENT
Start: 2021-01-11 | End: 2021-01-11 | Stop reason: HOSPADM

## 2021-01-11 RX ORDER — PROPOFOL 10 MG/ML
INJECTION, EMULSION INTRAVENOUS PRN
Status: DISCONTINUED | OUTPATIENT
Start: 2021-01-11 | End: 2021-01-11 | Stop reason: SDUPTHER

## 2021-01-11 RX ORDER — SODIUM CHLORIDE, SODIUM LACTATE, POTASSIUM CHLORIDE, CALCIUM CHLORIDE 600; 310; 30; 20 MG/100ML; MG/100ML; MG/100ML; MG/100ML
INJECTION, SOLUTION INTRAVENOUS CONTINUOUS
Status: DISCONTINUED | OUTPATIENT
Start: 2021-01-11 | End: 2021-01-11 | Stop reason: HOSPADM

## 2021-01-11 RX ORDER — FENTANYL CITRATE 50 UG/ML
50 INJECTION, SOLUTION INTRAMUSCULAR; INTRAVENOUS EVERY 5 MIN PRN
Status: DISCONTINUED | OUTPATIENT
Start: 2021-01-11 | End: 2021-01-11 | Stop reason: HOSPADM

## 2021-01-11 RX ORDER — BUPIVACAINE HYDROCHLORIDE 5 MG/ML
INJECTION, SOLUTION EPIDURAL; INTRACAUDAL
Status: COMPLETED | OUTPATIENT
Start: 2021-01-11 | End: 2021-01-11

## 2021-01-11 RX ORDER — SODIUM CHLORIDE 0.9 % (FLUSH) 0.9 %
10 SYRINGE (ML) INJECTION EVERY 12 HOURS SCHEDULED
Status: DISCONTINUED | OUTPATIENT
Start: 2021-01-11 | End: 2021-01-11 | Stop reason: HOSPADM

## 2021-01-11 RX ORDER — FENTANYL CITRATE 50 UG/ML
INJECTION, SOLUTION INTRAMUSCULAR; INTRAVENOUS PRN
Status: DISCONTINUED | OUTPATIENT
Start: 2021-01-11 | End: 2021-01-11 | Stop reason: SDUPTHER

## 2021-01-11 RX ORDER — HYDROCODONE BITARTRATE AND ACETAMINOPHEN 5; 325 MG/1; MG/1
1 TABLET ORAL EVERY 6 HOURS PRN
Qty: 20 TABLET | Refills: 0 | Status: SHIPPED | OUTPATIENT
Start: 2021-01-11 | End: 2021-01-16

## 2021-01-11 RX ORDER — ROCURONIUM BROMIDE 10 MG/ML
INJECTION, SOLUTION INTRAVENOUS PRN
Status: DISCONTINUED | OUTPATIENT
Start: 2021-01-11 | End: 2021-01-11 | Stop reason: SDUPTHER

## 2021-01-11 RX ADMIN — ATROPA BELLADONNA AND OPIUM 30 MG: 16.2; 3 SUPPOSITORY RECTAL at 12:14

## 2021-01-11 RX ADMIN — SODIUM CHLORIDE, POTASSIUM CHLORIDE, SODIUM LACTATE AND CALCIUM CHLORIDE: 600; 310; 30; 20 INJECTION, SOLUTION INTRAVENOUS at 11:28

## 2021-01-11 RX ADMIN — FENTANYL CITRATE 50 MCG: 50 INJECTION, SOLUTION INTRAMUSCULAR; INTRAVENOUS at 12:02

## 2021-01-11 RX ADMIN — CLINDAMYCIN PHOSPHATE 900 MG: 900 INJECTION, SOLUTION INTRAVENOUS at 12:32

## 2021-01-11 RX ADMIN — FENTANYL CITRATE 25 MCG: 50 INJECTION, SOLUTION INTRAMUSCULAR; INTRAVENOUS at 14:20

## 2021-01-11 RX ADMIN — ENOXAPARIN SODIUM 40 MG: 40 INJECTION SUBCUTANEOUS at 11:25

## 2021-01-11 RX ADMIN — ROCURONIUM BROMIDE 50 MG: 10 INJECTION, SOLUTION INTRAVENOUS at 12:04

## 2021-01-11 RX ADMIN — BUPIVACAINE HYDROCHLORIDE 40 ML: 5 INJECTION, SOLUTION EPIDURAL; INTRACAUDAL; PERINEURAL at 11:50

## 2021-01-11 RX ADMIN — DEXAMETHASONE SODIUM PHOSPHATE 4 MG: 4 INJECTION, SOLUTION INTRAMUSCULAR; INTRAVENOUS at 12:15

## 2021-01-11 RX ADMIN — DEXAMETHASONE SODIUM PHOSPHATE 10 MG: 10 INJECTION INTRAMUSCULAR; INTRAVENOUS at 11:50

## 2021-01-11 RX ADMIN — SUGAMMADEX 200 MG: 100 INJECTION, SOLUTION INTRAVENOUS at 13:20

## 2021-01-11 RX ADMIN — ONDANSETRON 4 MG: 2 INJECTION INTRAMUSCULAR; INTRAVENOUS at 12:01

## 2021-01-11 RX ADMIN — PROPOFOL 200 MG: 10 INJECTION, EMULSION INTRAVENOUS at 12:04

## 2021-01-11 RX ADMIN — DIMENHYDRINATE 50 MG: 50 TABLET ORAL at 11:25

## 2021-01-11 RX ADMIN — FENTANYL CITRATE 25 MCG: 50 INJECTION, SOLUTION INTRAMUSCULAR; INTRAVENOUS at 13:24

## 2021-01-11 RX ADMIN — KETOROLAC TROMETHAMINE 30 MG: 30 INJECTION, SOLUTION INTRAMUSCULAR; INTRAVENOUS at 13:20

## 2021-01-11 RX ADMIN — PHENYLEPHRINE HYDROCHLORIDE 100 MCG: 10 INJECTION INTRAVENOUS at 12:23

## 2021-01-11 RX ADMIN — FENTANYL CITRATE 25 MCG: 50 INJECTION, SOLUTION INTRAMUSCULAR; INTRAVENOUS at 12:46

## 2021-01-11 RX ADMIN — OXYCODONE HYDROCHLORIDE AND ACETAMINOPHEN 1 TABLET: 5; 325 TABLET ORAL at 14:42

## 2021-01-11 RX ADMIN — SODIUM CHLORIDE, PRESERVATIVE FREE 40 ML: 5 INJECTION INTRAVENOUS at 11:50

## 2021-01-11 RX ADMIN — SODIUM CHLORIDE, POTASSIUM CHLORIDE, SODIUM LACTATE AND CALCIUM CHLORIDE: 600; 310; 30; 20 INJECTION, SOLUTION INTRAVENOUS at 13:30

## 2021-01-11 RX ADMIN — GENTAMICIN SULFATE 476.4 MG: 40 INJECTION, SOLUTION INTRAMUSCULAR; INTRAVENOUS at 11:32

## 2021-01-11 RX ADMIN — LIDOCAINE HYDROCHLORIDE 100 MG: 20 INJECTION, SOLUTION EPIDURAL; INFILTRATION; INTRACAUDAL; PERINEURAL at 12:02

## 2021-01-11 ASSESSMENT — PAIN DESCRIPTION - ORIENTATION
ORIENTATION: LOWER

## 2021-01-11 ASSESSMENT — PAIN DESCRIPTION - LOCATION
LOCATION: ABDOMEN
LOCATION: GROIN

## 2021-01-11 ASSESSMENT — PAIN DESCRIPTION - ONSET
ONSET: ON-GOING
ONSET: ON-GOING

## 2021-01-11 ASSESSMENT — PAIN DESCRIPTION - FREQUENCY
FREQUENCY: CONTINUOUS

## 2021-01-11 ASSESSMENT — PAIN SCALES - GENERAL
PAINLEVEL_OUTOF10: 5
PAINLEVEL_OUTOF10: 7
PAINLEVEL_OUTOF10: 8
PAINLEVEL_OUTOF10: 7
PAINLEVEL_OUTOF10: 7
PAINLEVEL_OUTOF10: 5
PAINLEVEL_OUTOF10: 8

## 2021-01-11 ASSESSMENT — PAIN DESCRIPTION - PAIN TYPE
TYPE: SURGICAL PAIN
TYPE: SURGICAL PAIN
TYPE: CHRONIC PAIN
TYPE: SURGICAL PAIN

## 2021-01-11 ASSESSMENT — PAIN DESCRIPTION - DESCRIPTORS
DESCRIPTORS: PRESSURE

## 2021-01-11 ASSESSMENT — LIFESTYLE VARIABLES: SMOKING_STATUS: 1

## 2021-01-11 NOTE — ANESTHESIA PRE PROCEDURE
Department of Anesthesiology  Preprocedure Note       Name:  Danna Jaimes   Age:  39 y.o.  :  1975                                          MRN:  [id number]         Date:  2021      Surgeon: Dariusz Corrigan):  Renuka Landry DO    Procedure: Procedure(s): HYSTERECTOMY VAGINAL LAPAROSCOPIC ROBOTIC ASSISTED-POSSIBLE BSO, POSSIBLE LAPAROSCOPIC COLPOPEXY    Medications prior to admission:   Prior to Admission medications    Medication Sig Start Date End Date Taking? Authorizing Provider   esomeprazole (NEXIUM) 40 MG delayed release capsule Take 1 capsule by mouth once daily 20  Yes Historical Provider, MD   topiramate (TOPAMAX) 25 MG tablet TAKE 1 TABLET BY MOUTH IN THE MORNING AND 2 IN THE EVENING 20  Yes Historical Provider, MD   PARoxetine (PAXIL) 10 MG tablet Take 10 mg by mouth nightly   Yes Historical Provider, MD   hydrOXYzine (VISTARIL) 25 MG capsule Take 25 mg by mouth 3 times daily as needed for Itching   Yes Historical Provider, MD   prazosin (MINIPRESS) 1 MG capsule Take 1 mg by mouth nightly   Yes Historical Provider, MD   Elastic Bandages & Supports (ADJUSTABLE WRIST BRACE) MISC Wear braces at night. Dx .0 3/26/14  Yes Jerson Aranda MD   cetirizine (ZYRTEC) 10 MG tablet Take 10 mg by mouth daily. Yes Historical Provider, MD   COSENTYX, 300 MG DOSE, 150 MG/ML SOSY  20   Historical Provider, MD   Fluticasone Furoate 50 MCG/ACT AEPB Inhale into the lungs    Historical Provider, MD   albuterol (PROVENTIL HFA;VENTOLIN HFA) 108 (90 BASE) MCG/ACT inhaler Inhale 2 puffs into the lungs 4 times daily as needed.     Historical Provider, MD       Current medications:    Current Facility-Administered Medications   Medication Dose Route Frequency Provider Last Rate Last Admin    lactated ringers infusion   Intravenous Continuous Renuka Landry  mL/hr at 21 1128 New Bag at 21 1128 Procedure Laterality Date    CARPAL TUNNEL RELEASE Bilateral     CERVIX LESION DESTRUCTION      ENDOMETRIAL ABLATION      TONSILLECTOMY      TUBAL LIGATION      TYMPANOSTOMY TUBE PLACEMENT         Social History:    Social History     Tobacco Use    Smoking status: Current Every Day Smoker     Packs/day: 1.00     Years: 10.00     Pack years: 10.00    Smokeless tobacco: Never Used   Substance Use Topics    Alcohol use: Yes     Comment: rarely                                Ready to quit: Not Answered  Counseling given: Not Answered      Vital Signs (Current):   Vitals:    12/30/20 1140 01/11/21 1115   BP:  112/63   Pulse:  76   Resp:  18   Temp:  36.4 °C (97.6 °F)   TempSrc:  Temporal   SpO2:  98%   Weight: 210 lb (95.3 kg) 212 lb (96.2 kg)   Height: 5' 6\" (1.676 m) 5' 6\" (1.676 m)                                              BP Readings from Last 3 Encounters:   01/11/21 112/63   01/06/21 102/70   11/20/20 116/76       NPO Status: Time of last liquid consumption: 0500(small sip of water)                        Time of last solid consumption: 2230                        Date of last liquid consumption: 01/11/21                        Date of last solid food consumption: 01/10/21    BMI:   Wt Readings from Last 3 Encounters:   01/11/21 212 lb (96.2 kg)   01/06/21 219 lb (99.3 kg)   11/20/20 215 lb 3.2 oz (97.6 kg)     Body mass index is 34.22 kg/m². CBC:   Lab Results   Component Value Date    WBC 7.0 01/06/2021    RBC 4.53 01/06/2021    HGB 14.2 01/06/2021    HCT 43.3 01/06/2021    MCV 95.6 01/06/2021    RDW 13.2 01/06/2021     01/06/2021       CMP:   Lab Results   Component Value Date    BUN 11 06/27/2017    CREATININE 0.7 06/27/2017    LABGLOM >90 06/27/2017    PROT 7.0 03/11/2014    AST 33 06/27/2017    ALT 65 06/27/2017       POC Tests: No results for input(s): POCGLU, POCNA, POCK, POCCL, POCBUN, POCHEMO, POCHCT in the last 72 hours.     Coags:   Lab Results   Component Value Date PROTIME 9.6 11/04/2020    INR 0.9 11/04/2020    APTT 26.7 11/04/2020       HCG (If Applicable): No results found for: PREGTESTUR, PREGSERUM, HCG, HCGQUANT     ABGs: No results found for: PHART, PO2ART, NCU4XDS, HUQ9UMF, BEART, E0QLRXFY     Type & Screen (If Applicable):  No results found for: LABABO, LABRH    Drug/Infectious Status (If Applicable):  No results found for: HIV, HEPCAB    COVID-19 Screening (If Applicable):   Lab Results   Component Value Date    COVID19 Not Detected 01/06/2021         Anesthesia Evaluation     history of anesthetic complications: PONV. Airway: Mallampati: II  TM distance: >3 FB   Neck ROM: full  Mouth opening: > = 3 FB Dental:      Comment: Visible decay on front upper teeth. Pulmonary:normal exam    (+) asthma: current smoker (1 ppd, 30 pack year hx)    (-) recent URI          Patient did not smoke on day of surgery. Cardiovascular:  Exercise tolerance: good (>4 METS),                     Neuro/Psych:   (+) neuromuscular disease (Peripheral neuropathy):, headaches: migraine headaches, psychiatric history:depression/anxiety             GI/Hepatic/Renal:   (+) GERD: well controlled, PUD,           Endo/Other:                     Abdominal:   (+) obese,         Vascular:                                        Anesthesia Plan      regional and general     ASA 3     (Discussed R/B/A of regional aneshteisa for post operative pain control. PT agrees [employer] plan and wishes to proceed. )  Induction: intravenous. MIPS: Postoperative opioids intended and Prophylactic antiemetics administered. Anesthetic plan and risks discussed with patient. Use of blood products discussed with patient whom consented to blood products.                    [address] [facility], APRN - CRNA   1/11/2021

## 2021-01-11 NOTE — ANESTHESIA PROCEDURE NOTES
Peripheral Block    Patient location during procedure: pre-op  Start time: 1/11/2021 11:48 AM  End time: 1/11/2021 11:50 AM  Staffing  Performed: resident/CRNA   Resident/CRNA: JONEL Govea CRNA  Preanesthetic Checklist  Completed: patient identified, IV checked, site marked, risks and benefits discussed, surgical consent, monitors and equipment checked, pre-op evaluation, timeout performed, anesthesia consent given, oxygen available and patient being monitored  Peripheral Block  Patient position: supine  Prep: ChloraPrep  Patient monitoring: continuous pulse ox, frequent blood pressure checks and IV access  Block type: TAP  Laterality: bilateral  Injection technique: single-shot  Guidance: ultrasound guided  Provider prep: mask and sterile gloves  Needle  Needle type: short-bevel   Needle gauge: 20 G  Needle length: 8 cm  Needle localization: anatomical landmarks and ultrasound guidance  Assessment  Injection assessment: negative aspiration for heme and no paresthesia on injection  Slow fractionated injection: yes  Hemodynamics: stable  Medications Administered  Bupivacaine (MARCAINE) PF injection 0.5%, 40 mL  Reason for block: post-op pain management

## 2021-01-11 NOTE — OP NOTE
Operative Note    Preoperative diagnosis:   1. Menorrhagia  2. Dysmenorrhea    Postoperative diagnosis: Same    Procedure:  Robotic-assisted Total Laparoscopic Hysterectomy with Bilateral Salpingo-oophorectomy    Surgeon: Dr. Abiel Bocanegra    Asst.: Pamella/Lito    Anesthesia: Gen. Estimated blood loss: 25 mL    Fluids: LR    Urine: 300 mL of clear urine    Condition: Stable    Complications: None    Findings: The patient had an anteverted uterus which sounded to  8 cm in depth. There was endometriosis noted in the posterior cul de sac. Bilateral ureteral peristalsis was noted throughout the case and after closure of the vaginal cuff.     Specimen removed: Uterus and Bilateral tubes and ovaries Operative note: The patient was taken to the operating room where general anesthesia was obtained without difficulty. She was prepped and draped usual sterile fashion in a dorsal lithotomy position. Timeout was performed. A weighted speculum was placed in the patient's vagina and the anterior lip of the cervix was grasped with a single-tooth tenaculum. The cervix was progressively dilated and the uterus was sounded with the findings noted above. A V care uterine manipulator was then placed. A Calvert catheter was placed and left to gravity drainage. At this point attention was turned to the patient's abdomen where a supraumbilical incision was made with a scalpel. A veress needle was then carefully introduced at a 45° angle while tenting the abdominal wall. Intraabdominal placement was confirmed by use of water-filled syringe and drop in intra-abdominal pressure with insufflation of CO2. Pneumoperitoneum was obtained to half liters of CO2. A robotic port was then placed without difficulty and intra-abdominal placement was confirmed by laparoscopy. Second and third ports were then placed under direct visualization approximately 4 cm inferior and  5 cm lateral to the first.  8 mm robotic ports were placed in these locations. A fourth and final port was placed to the right. We placed a 12 mm step port in this location. At this point the patient was placed into steep Trendelenburg position. The robot was brought into position and docked. The right arm was loaded with the scissors with monopolar cautery. The left side was loaded with the fenestrated bipolar. Attention was then turned to the console portion of the surgery. Beginning with the left cornual region of uterus, the tube and ovarian pedicle were ligated with the fenestrated bipolar and transected with the scissors. Dissection continued along the broad ligament until the round ligament was ligated and transected.   The anterior uterine serosa was then undermined and taken down to free the bladder from the lower uterine segment and cervix. This was done working from the left side to the midline. Attention was then turned to the right cornual region of the uterus and in a similar manner the tube and ovary were ligated and transected. Dissection again continued along the broad ligament until the round ligament was ligated and transected. The remainder of the bladder flap was then taken down. Once the bladder was freed from the lower uterine segment and cervix, the uterine arteries were skeletonized, ligated, and transected bilaterally. Using the scissors, the uterus was amputated just beneath the cervix using the groove of the V care as a guide. The uterus was then withdrawn into the vagina to maintain pneumoperitoneum. Attention was then turned to the tubes and ovaries which were removed by ligating across the infundibulopelvic ligament and along the mesosalpinx with the fenestrated bipolar and serially transecting with the scissors until the tubes and ovaries were freed from the sidewalls. Tubes and ovaries were placed in the vagina. The vaginal cuff was then closed with 0 V loc suture working from right to left. Copious amounts of irrigation were then used to evaluate the cuff and pedicles to assure hemostasis. Bilateral ureteral peristalsis was again noted. At this point the instruments removed from the abdomen. The skin incisions were closed with 4-0 Monocryl in a subcuticular fashion. The uterus and bilateral tubes and ovaries were handed off to pathology. The vaginal cuff was then examined with no evidence of bleeding. The Calvert catheter was removed. Sponge, lap, needle and instrument counts were correct ×2 and the patient was taken to the recovery area in apparently stable condition.

## 2021-01-11 NOTE — PROGRESS NOTES
Pt verbalized readiness to go home. Discharge instructions given to pt and son. Verbalized understanding and all questions answered at this time. Discharge Criteria    Inpatients must meet Criteria 1 through 7. All other patients are either YES or N/A. If a NO is chosen then Anesthesia or Surgeon must be notified. 1.  Minimum 30 minutes after last dose of sedative medication, minimum 120 minutes after last dose of reversal agent. Yes      2. Systolic BP stable within 20 mmHg for 30 minutes & systolic BP between 90 & 885 or within 10 mmHg of baseline. Yes      3. Pulse between 60 and 100 or within 10 bpm of baseline. Yes      4. Spontaneous respiratory rate >/= 10 per minute. Yes      5. SaO2 >/= 95 or  >/= baseline. Yes      6. Able to cough and swallow or return to baseline function. Yes      7. Alert and oriented or return to baseline mental status. Yes      8. Demonstrates controlled, coordinated movements, ambulates with steady gait, or return to baseline activity function. Yes      9. Minimal or no pain or nausea, or at a level tolerable and acceptable to patient. Yes      10. Takes and retains oral fluids as allowed. Yes      11. Procedural / perioperative site stable. Minimal or no bleeding. Yes          12. If GI endoscopy procedure, minimal or no abdominal distention or passing flatus. N/A      13. Written discharge instructions and emergency telephone number provided. Yes      14. Accompanied by a responsible adult.     Yes

## 2021-01-11 NOTE — ANESTHESIA POSTPROCEDURE EVALUATION
Department of Anesthesiology  Postprocedure Note    Patient: Andrew Cisneros  MRN: [id number]  YOB: 1975  Date of evaluation: 1/11/2021  Time:  4:18 PM     Procedure Summary     Date: 01/11/21 Room / Location: [address]    Anesthesia Start: 6011 Anesthesia Stop: 4863    Procedure: HYSTERECTOMY VAGINAL LAPAROSCOPIC ROBOTIC ASSISTED- BSO (N/A ) Diagnosis: (MENORRHAGIA ENDOMETNOSIS)    Surgeons: Grayson Saab DO Responsible Provider: Bonnie Shaffer    Anesthesia Type: regional, general ASA Status: 3          Anesthesia Type: regional, general    Minh Phase I: Minh Score: 10    Minh Phase II: Minh Score: 10    Last vitals: Reviewed and per EMR flowsheets.        Anesthesia Post Evaluation    Patient location during evaluation: PACU  Patient participation: complete - patient participated  Level of consciousness: awake and alert  Pain score: 2  Airway patency: patent  Nausea & Vomiting: no vomiting and no nausea  Complications: no  Cardiovascular status: hemodynamically stable  Respiratory status: spontaneous ventilation and room air  Hydration status: stable

## 2021-01-11 NOTE — PROGRESS NOTES
Pt states her pain is more of a pressure as if she feels like she needs to go to the bathroom. This RN gave pt the option of IV pain medication or option to get up to the bathroom and try to see if she is able to go and maybe that will help. Pt states she would like to try going to the bathroom.

## 2021-01-13 LAB — SURGICAL PATHOLOGY REPORT: NORMAL

## 2021-01-20 ENCOUNTER — OFFICE VISIT (OUTPATIENT)
Dept: OBGYN CLINIC | Age: 46
End: 2021-01-20

## 2021-01-20 VITALS
DIASTOLIC BLOOD PRESSURE: 80 MMHG | HEIGHT: 66 IN | SYSTOLIC BLOOD PRESSURE: 120 MMHG | BODY MASS INDEX: 35.03 KG/M2 | WEIGHT: 218 LBS

## 2021-01-20 DIAGNOSIS — N92.1 MENORRHAGIA WITH IRREGULAR CYCLE: Primary | ICD-10-CM

## 2021-01-20 DIAGNOSIS — N80.9 ENDOMETRIOSIS DETERMINED BY LAPAROSCOPY: ICD-10-CM

## 2021-01-20 PROCEDURE — 99024 POSTOP FOLLOW-UP VISIT: CPT | Performed by: OBSTETRICS & GYNECOLOGY

## 2021-01-20 RX ORDER — VENLAFAXINE HYDROCHLORIDE 37.5 MG/1
CAPSULE, EXTENDED RELEASE ORAL
COMMUNITY
Start: 2021-01-14 | End: 2021-05-19

## 2021-01-20 NOTE — PROGRESS NOTES
 cetirizine (ZYRTEC) 10 MG tablet Take 10 mg by mouth daily.  venlafaxine (EFFEXOR XR) 37.5 MG extended release capsule       ibuprofen (ADVIL;MOTRIN) 800 MG tablet Take 1 tablet by mouth every 8 hours as needed for Pain 20 tablet 0    Elastic Bandages & Supports (ADJUSTABLE WRIST BRACE) MISC Wear braces at night. Dx .0 2 each 0     No current facility-administered medications for this visit. Social History     Socioeconomic History    Marital status:      Spouse name: Not on file    Number of children: Not on file    Years of education: Not on file    Highest education level: Not on file   Occupational History    Not on file   Social Needs    Financial resource strain: Not on file    Food insecurity     Worry: Not on file     Inability: Not on file    Transportation needs     Medical: Not on file     Non-medical: Not on file   Tobacco Use    Smoking status: Current Every Day Smoker     Packs/day: 1.00     Years: 10.00     Pack years: 10.00    Smokeless tobacco: Never Used   Substance and Sexual Activity    Alcohol use: Yes     Comment: rarely    Drug use: Never    Sexual activity: Not Currently   Lifestyle    Physical activity     Days per week: Not on file     Minutes per session: Not on file    Stress: Not on file   Relationships    Social connections     Talks on phone: Not on file     Gets together: Not on file     Attends [employer] service: Not on file     Active member of club or organization: Not on file     Attends meetings of clubs or organizations: Not on file     Relationship status: Not on file    Intimate partner violence     Fear of current or ex partner: Not on file     Emotionally abused: Not on file     Physically abused: Not on file     Forced sexual activity: Not on file   Other Topics Concern    Not on file   Social History Narrative    Not on file       REVIEW OF SYSTEMS:  Review of Systems   Constitutional: Negative for chills and fever. Genitourinary: Negative for dysuria, pelvic pain and vaginal bleeding. PHYSICAL EXAM:  /80 (Site: Right Upper Arm, Position: Sitting)   Ht 5' 6\" (1.676 m)   Wt 218 lb (98.9 kg)   LMP 08/31/2020   BMI 35.19 kg/m²   Physical Exam  Abdominal:      Comments: Inc c/d/i         ASSESSMENT:      1. Menorrhagia with irregular cycle    2. Endometriosis determined by laparoscopy        PLAN:  No orders of the defined types were placed in this encounter. Return in about 4 weeks (around 2/17/2021) for follow up.        Electronically signed by Brennen Melo DO on 01/20/21

## 2021-02-18 ENCOUNTER — OFFICE VISIT (OUTPATIENT)
Dept: OBGYN CLINIC | Age: 46
End: 2021-02-18

## 2021-02-18 VITALS
WEIGHT: 224 LBS | BODY MASS INDEX: 36 KG/M2 | HEIGHT: 66 IN | SYSTOLIC BLOOD PRESSURE: 104 MMHG | DIASTOLIC BLOOD PRESSURE: 82 MMHG

## 2021-02-18 DIAGNOSIS — N80.03 ADENOMYOSIS: ICD-10-CM

## 2021-02-18 DIAGNOSIS — N92.1 MENORRHAGIA WITH IRREGULAR CYCLE: Primary | ICD-10-CM

## 2021-02-18 PROCEDURE — 99024 POSTOP FOLLOW-UP VISIT: CPT | Performed by: OBSTETRICS & GYNECOLOGY

## 2021-02-18 RX ORDER — TRAZODONE HYDROCHLORIDE 50 MG/1
TABLET ORAL
COMMUNITY
Start: 2021-02-11 | End: 2021-05-19

## 2021-02-18 RX ORDER — ESTRADIOL 1 MG/1
1 TABLET ORAL DAILY
Qty: 30 TABLET | Refills: 3 | Status: SHIPPED | OUTPATIENT
Start: 2021-02-18 | End: 2021-05-19

## 2021-02-18 NOTE — PROGRESS NOTES
DATE OF VISIT:  2/18/21    PATIENT NAME:  Nirali Warren     YOB: 1975    REASON FOR VISIT:    Chief Complaint   Patient presents with    Post-Op Check     Pt. had TLH/BSO on 1-    Menopause     Pt. c/o night sweats and insomnia, during the day she does okay. She has trazadone that she has been using to help sleep but it doesn't seem to be sleeping. HISTORY OF PRESENT ILLNESS:  Doing well since surgery - having night sweats and insomnia since surgery - would like to try hrt; no bleeding no pain      Patient's last menstrual period was 08/31/2020. Vitals:    02/18/21 1521   BP: 104/82   Site: Right Upper Arm   Position: Sitting   Weight: 224 lb (101.6 kg)   Height: 5' 6\" (1.676 m)     Body mass index is 36.15 kg/m².   Allergies   Allergen Reactions    Dextromethorphan-Guaifenesin Rash     Other reaction(s): Dizziness    Aleve [Naproxen] Hives     Gets hives from Aleve and Excedrin migraine or any migraine medications but can take other naproxen products    Bee Venom Swelling    Ciprofloxacin Hives    Darvocet A500 [Propoxyphene N-Acetaminophen]      Hallucinate and sleep     Keflex [Cephalexin] Hives    Pcn [Penicillins] Hives    Zithromax [Azithromycin] Hives    Bactrim [Sulfamethoxazole-Trimethoprim] Hives and Rash    Oxycodone Rash     Current Outpatient Medications   Medication Sig Dispense Refill    traZODone (DESYREL) 50 MG tablet TAKE 1 TO 2 TABLETS BY MOUTH AT BEDTIME      venlafaxine (EFFEXOR XR) 37.5 MG extended release capsule       esomeprazole (NEXIUM) 40 MG delayed release capsule Take 1 capsule by mouth once daily      topiramate (TOPAMAX) 25 MG tablet TAKE 1 TABLET BY MOUTH IN THE MORNING AND 2 IN THE EVENING      COSENTYX, 300 MG DOSE, 150 MG/ML SOSY       hydrOXYzine (VISTARIL) 25 MG capsule Take 25 mg by mouth 3 times daily as needed for Itching      Fluticasone Furoate 50 MCG/ACT AEPB Inhale into the lungs  albuterol (PROVENTIL HFA;VENTOLIN HFA) 108 (90 BASE) MCG/ACT inhaler Inhale 2 puffs into the lungs 4 times daily as needed.  cetirizine (ZYRTEC) 10 MG tablet Take 10 mg by mouth daily.  ibuprofen (ADVIL;MOTRIN) 800 MG tablet Take 1 tablet by mouth every 8 hours as needed for Pain 20 tablet 0    prazosin (MINIPRESS) 1 MG capsule Take 1 mg by mouth nightly      Elastic Bandages & Supports (ADJUSTABLE WRIST BRACE) MISC Wear braces at night. Dx .0 2 each 0     No current facility-administered medications for this visit.       Social History     Socioeconomic History    Marital status:      Spouse name: Not on file    Number of children: Not on file    Years of education: Not on file    Highest education level: Not on file   Occupational History    Not on file   Social Needs    Financial resource strain: Not on file    Food insecurity     Worry: Not on file     Inability: Not on file    Transportation needs     Medical: Not on file     Non-medical: Not on file   Tobacco Use    Smoking status: Current Every Day Smoker     Packs/day: 1.00     Years: 10.00     Pack years: 10.00    Smokeless tobacco: Never Used   Substance and Sexual Activity    Alcohol use: Yes     Comment: rarely    Drug use: Never    Sexual activity: Not Currently   Lifestyle    Physical activity     Days per week: Not on file     Minutes per session: Not on file    Stress: Not on file   Relationships    Social connections     Talks on phone: Not on file     Gets together: Not on file     Attends [employer] service: Not on file     Active member of club or organization: Not on file     Attends meetings of clubs or organizations: Not on file     Relationship status: Not on file    Intimate partner violence     Fear of current or ex partner: Not on file     Emotionally abused: Not on file     Physically abused: Not on file     Forced sexual activity: Not on file   Other Topics Concern    Not on file Social History Narrative    Not on file       REVIEW OF SYSTEMS:  Review of Systems   Constitutional: Negative for chills and fever. Genitourinary: Negative for dysuria, pelvic pain, vaginal bleeding and vaginal discharge. Hot flashes but worse night sweats       PHYSICAL EXAM:  /82 (Site: Right Upper Arm, Position: Sitting)   Ht 5' 6\" (1.676 m)   Wt 224 lb (101.6 kg)   LMP 08/31/2020   BMI 36.15 kg/m²   Physical Exam  Constitutional:       Appearance: Normal appearance. Genitourinary:      Vulva normal.      Cervix is absent. Uterus is absent. Genitourinary Comments: Cuff well supported - 2 sutures visible   Abdominal:      General: Abdomen is flat. Palpations: Abdomen is soft. Comments: Inc well healed   Neurological:      Mental Status: She is alert. ASSESSMENT:      1. Menorrhagia with irregular cycle    2. Adenomyosis        PLAN:  No orders of the defined types were placed in this encounter.     Return in about 3 months (around 5/18/2021) for annual.       Electronically signed by Marisol Gonzalez DO on 02/18/21

## 2021-05-19 ENCOUNTER — OFFICE VISIT (OUTPATIENT)
Dept: OBGYN CLINIC | Age: 46
End: 2021-05-19
Payer: COMMERCIAL

## 2021-05-19 VITALS
HEART RATE: 84 BPM | DIASTOLIC BLOOD PRESSURE: 74 MMHG | WEIGHT: 238 LBS | SYSTOLIC BLOOD PRESSURE: 109 MMHG | BODY MASS INDEX: 38.41 KG/M2

## 2021-05-19 DIAGNOSIS — Z01.419 WOMEN'S ANNUAL ROUTINE GYNECOLOGICAL EXAMINATION: Primary | ICD-10-CM

## 2021-05-19 DIAGNOSIS — N95.1 MENOPAUSAL SYMPTOMS: ICD-10-CM

## 2021-05-19 PROCEDURE — 99396 PREV VISIT EST AGE 40-64: CPT | Performed by: OBSTETRICS & GYNECOLOGY

## 2021-05-19 RX ORDER — DOXYCYCLINE HYCLATE 100 MG/1
100 CAPSULE ORAL 2 TIMES DAILY
COMMUNITY
Start: 2021-05-11 | End: 2021-05-21

## 2021-05-19 RX ORDER — ESTERIFIED ESTROGEN AND METHYLTESTOSTERONE 1.25; 2.5 MG/1; MG/1
1 TABLET ORAL DAILY
Qty: 30 TABLET | Refills: 1 | Status: SHIPPED | OUTPATIENT
Start: 2021-05-19 | End: 2021-06-30 | Stop reason: SDUPTHER

## 2021-05-19 RX ORDER — TOPIRAMATE 50 MG/1
50 TABLET, FILM COATED ORAL 2 TIMES DAILY
COMMUNITY
Start: 2021-04-30 | End: 2022-04-04

## 2021-05-19 RX ORDER — SUMATRIPTAN 100 MG/1
100 TABLET, FILM COATED ORAL
COMMUNITY
Start: 2021-04-30 | End: 2022-04-30

## 2021-05-19 ASSESSMENT — ENCOUNTER SYMPTOMS
CONSTIPATION: 0
ABDOMINAL PAIN: 0
DIARRHEA: 0
SHORTNESS OF BREATH: 0

## 2021-05-19 NOTE — PROGRESS NOTES
BSO performed by Khari Littlejohn DO at [facility] Bilateral 01/11/2021    TONSILLECTOMY      TUBAL LIGATION      TYMPANOSTOMY TUBE PLACEMENT       Family History   Problem Relation Age of Onset    Stroke Mother     Hypertension Mother    [place] Elevated Lipids Mother     Cancer Mother         uterine    Lupus Mother     Other Mother         myasthenia gravis    Heart Disease Father     Hypertension Father     Elevated Lipids Father     Seizures Father     Asthma Father     Cancer Father         skin    Heart Disease Other     Stroke Other     Hypertension Other     Elevated Lipids Other     Thyroid Disease Other     Alzheimer's Disease Other     Cancer Other         lung/skin/colon    Mult Sclerosis Other      Social History     Socioeconomic History    Marital status:      Spouse name: Not on file    Number of children: Not on file    Years of education: Not on file    Highest education level: Not on file   Occupational History    Not on file   Tobacco Use    Smoking status: Current Every Day Smoker     Packs/day: 1.00     Years: 10.00     Pack years: 10.00    Smokeless tobacco: Never Used   Vaping Use    Vaping Use: Never used   Substance and Sexual Activity    Alcohol use: Yes     Comment: rarely    Drug use: Never    Sexual activity: Not Currently   Other Topics Concern    Not on file   Social History Narrative    Not on file     Social Determinants of Health     Financial Resource Strain:     Difficulty of Paying Living Expenses:    Food Insecurity:     Worried About Running Out of Food in the Last Year:     [address] in the Last Year:    Transportation Needs:     Lack of Transportation (Medical):      Lack of Transportation (Non-Medical):    Physical Activity:     Days of Exercise per Week:     Minutes of Exercise per Session:    Stress:     Feeling of Stress :    Social Connections:     Frequency of Communication with Friends and Family:     Frequency of Social Gatherings with Friends and Family:     Attends Alevism Services:     Active Member of Clubs or Organizations:     Attends Club or Organization Meetings:     Marital Status:    Intimate Partner Violence:     Fear of Current or Ex-Partner:     Emotionally Abused:     Physically Abused:     Sexually Abused:      Vitals:    05/19/21 1451   BP: 109/74   Site: Left Upper Arm   Position: Sitting   Pulse: 84   Weight: 238 lb (108 kg)     Body mass index is 38.41 kg/m². Patient's last menstrual period was 08/31/2020. MEDICATIONS:  Current Outpatient Medications   Medication Sig Dispense Refill    Secukinumab (COSENTYX SC) Inject into the skin      topiramate (TOPAMAX) 50 MG tablet Take 50 mg by mouth 2 times daily      doxycycline hyclate (VIBRAMYCIN) 100 MG capsule Take 100 mg by mouth 2 times daily      SUMAtriptan (IMITREX) 100 MG tablet Take 100 mg by mouth once as needed      estrogens, conjugated,-methylTESTOSTERone (ESTRATEST) 1.25-2.5 MG per tablet Take 1 tablet by mouth daily. 30 tablet 1    estradiol (ESTRACE) 1 MG tablet Take 1 tablet by mouth daily 30 tablet 3    esomeprazole (NEXIUM) 40 MG delayed release capsule Take 1 capsule by mouth once daily      hydrOXYzine (VISTARIL) 25 MG capsule Take 25 mg by mouth 3 times daily as needed for Itching      Fluticasone Furoate 50 MCG/ACT AEPB Inhale into the lungs      albuterol (PROVENTIL HFA;VENTOLIN HFA) 108 (90 BASE) MCG/ACT inhaler Inhale 2 puffs into the lungs 4 times daily as needed.  cetirizine (ZYRTEC) 10 MG tablet Take 10 mg by mouth daily.       fluocinonide (LIDEX) 0.05 % cream APPLY CREAM EXTERNALLY TWICE DAILY      traZODone (DESYREL) 50 MG tablet TAKE 1 TO 2 TABLETS BY MOUTH AT BEDTIME      venlafaxine (EFFEXOR XR) 37.5 MG extended release capsule       ibuprofen (ADVIL;MOTRIN) 800 MG tablet Take 1 tablet by mouth every 8 hours as needed for Pain 20 tablet 0    Elastic Bandages & Supports (ADJUSTABLE WRIST BRACE) [facility] Wear braces at night. Dx .0 2 each 0     No current facility-administered medications for this visit. ALLERGIES:  Allergies as of 05/19/2021 - Fully Reviewed 05/19/2021   Allergen Reaction Noted    Dextromethorphan-guaifenesin Rash 08/20/2020    Aleve [naproxen] Hives 01/06/2021    Bee venom Swelling 03/07/2014    Ciprofloxacin Hives 03/07/2014    Darvocet a500 [propoxyphene n-acetaminophen]  03/07/2014    Keflex [cephalexin] Hives 03/07/2014    Pcn [penicillins] Hives 03/11/2014    Zithromax [azithromycin] Hives 03/11/2014    Bactrim [sulfamethoxazole-trimethoprim] Hives and Rash 03/07/2014    Oxycodone Rash 03/11/2014           Symptoms of decreased mood absent    **If either question is answered in a  positive fashion then completethe PHQ9 Scoring Evaluation and make the appropriate referral**      Gynecologic History:       Patient's last menstrual period was 08/31/2020. Had hysterectomy    Sexually Active: Yes    STD History: No     Hormone Replacement Exposure: yes      Genetic Qualified Family History of Breast, Ovarian , Colon or Uterine Cancer:No   If YES see scanned worksheet. Preventative Health Testing:  Colposcopy History:   Date of Last Mammogram: due  Date of Last Colonoscopy:   Date of Last Bone Density:      ______________________________________________________________________    REVIEW OF SYSTEMS:       Review of Systems   Constitutional: Negative for chills, fatigue and fever. Respiratory: Negative for shortness of breath. Cardiovascular: Negative for chest pain. Gastrointestinal: Negative for abdominal pain, constipation and diarrhea. Genitourinary: Negative for dysuria, enuresis, frequency, menstrual problem, pelvic pain, urgency and vaginal bleeding. Hot flashes, dec libido   Neurological: Negative for dizziness, light-headedness and headaches.        PHYSICAL EXAM:    OBGyn Exam    POC Cultures:  No results found for this visit on 05/19/21. ASSESSMENT:      39 y.o. Annual  1. Women's annual routine gynecological examination    2. Menopausal symptoms          Patient Active Problem List    Diagnosis Date Noted    Post-op pain     Menorrhagia with regular cycle     Dysmenorrhea     Bleeding tendency ([address] 11/20/2020    Easy bruising 11/20/2020    Family history of lupus anticoagulant disorder 11/20/2020    Depressive disorder     Fall at home     CTS (carpal tunnel syndrome)     Peripheral neuropathy     Neck pain     Cervical radicular pain     Balance problem     Anxiety     Asthma     Migraine     Current smoker      replace inactive diagnosis      Headache           Hereditary Breast, Ovarian, Colon and Uterine Cancer screening Done. Tobacco & Secondary smoke risks reviewed; instructed on cessation and avoidance    PLAN:    Return in about 6 weeks (around 6/30/2021) for follow up. No orders of the defined types were placed in this encounter. Repeat Annual every 1 year  Cervical Cytology Evaluation begins at 24years old. If Negative Cytology, Follow-up screening per current guidelines. Mammograms every 1year. If 37 yo and last mammogram was negative. Calcium and Vitamin D dosing reviewed. Colonoscopy screening reviewed as well as onset for bone density testing. Birth control and barrier recommendationsdiscussed. STD counseling and prevention reviewed. Gardisil counseling completed for all patients 7-33 yo. Routine healthmaintenance per patients PCP.     Electronicallysigned by:  Behzad Galeas DO on 05/19/21

## 2021-05-26 ENCOUNTER — TELEPHONE (OUTPATIENT)
Dept: OBGYN CLINIC | Age: 46
End: 2021-05-26

## 2021-05-26 NOTE — TELEPHONE ENCOUNTER
Pt called because her estratest RX needs some kind of further information from our office sent to [facility] in [place]. She indicated that she is sure they have sent it us, and asked if we could get it back to them so they can fill the RX. Thanks.

## 2021-05-26 NOTE — TELEPHONE ENCOUNTER
Spoke to Hira Barton at [facility] in [place], her medication needs a PA. She will fax the information.

## 2021-05-27 ENCOUNTER — OFFICE VISIT (OUTPATIENT)
Dept: OPTOMETRY | Age: 46
End: 2021-05-27
Payer: COMMERCIAL

## 2021-05-27 DIAGNOSIS — H52.203 HYPEROPIA OF BOTH EYES WITH ASTIGMATISM: Primary | ICD-10-CM

## 2021-05-27 DIAGNOSIS — H52.03 HYPEROPIA OF BOTH EYES WITH ASTIGMATISM: Primary | ICD-10-CM

## 2021-05-27 PROCEDURE — G8417 CALC BMI ABV UP PARAM F/U: HCPCS | Performed by: OPTOMETRIST

## 2021-05-27 PROCEDURE — G8427 DOCREV CUR MEDS BY ELIG CLIN: HCPCS | Performed by: OPTOMETRIST

## 2021-05-27 PROCEDURE — 92004 COMPRE OPH EXAM NEW PT 1/>: CPT | Performed by: OPTOMETRIST

## 2021-05-27 PROCEDURE — 92015 DETERMINE REFRACTIVE STATE: CPT | Performed by: OPTOMETRIST

## 2021-05-27 PROCEDURE — 4004F PT TOBACCO SCREEN RCVD TLK: CPT | Performed by: OPTOMETRIST

## 2021-05-27 ASSESSMENT — REFRACTION_MANIFEST
OD_AXIS: 045
OS_ADD: +1.00
OD_SPHERE: +0.75
OD_ADD: +1.00
OS_SPHERE: +0.75
OS_AXIS: 105
OD_CYLINDER: -0.50
OS_CYLINDER: -0.25

## 2021-05-27 ASSESSMENT — VISUAL ACUITY
OS_CC: 20/40
OD_CC+: -1
METHOD: SNELLEN - LINEAR
CORRECTION_TYPE: GLASSES
OD_CC: 20/50 OU

## 2021-05-27 ASSESSMENT — REFRACTION_WEARINGRX
OD_AXIS: 079
OS_CYLINDER: -0.75
OS_SPHERE: +1.00
OS_AXIS: 071
OD_CYLINDER: -0.75
OD_SPHERE: +1.25
SPECS_TYPE: SVL

## 2021-05-27 ASSESSMENT — TONOMETRY
OS_IOP_MMHG: 18
OD_IOP_MMHG: 14
IOP_METHOD: NON-CONTACT AIR PUFF

## 2021-05-27 ASSESSMENT — ENCOUNTER SYMPTOMS
ALLERGIC/IMMUNOLOGIC NEGATIVE: 0
RESPIRATORY NEGATIVE: 0
EYES NEGATIVE: 0
GASTROINTESTINAL NEGATIVE: 0

## 2021-05-27 ASSESSMENT — SLIT LAMP EXAM - LIDS
COMMENTS: NORMAL
COMMENTS: NORMAL

## 2021-05-27 NOTE — PROGRESS NOTES
Disease Father     Hypertension Father     Elevated Lipids Father     Seizures Father     Asthma Father     Cancer Father         skin    Heart Disease Other     Stroke Other     Hypertension Other     Elevated Lipids Other     Thyroid Disease Other     Alzheimer's Disease Other     Cancer Other         lung/skin/colon    Mult Sclerosis Other      Social History     Socioeconomic History    Marital status:      Spouse name: None    Number of children: None    Years of education: None    Highest education level: None   Occupational History    None   Tobacco Use    Smoking status: Current Every Day Smoker     Packs/day: 1.00     Years: 10.00     Pack years: 10.00    Smokeless tobacco: Never Used   Vaping Use    Vaping Use: Never used   Substance and Sexual Activity    Alcohol use: Yes     Comment: rarely    Drug use: Never    Sexual activity: Not Currently   Other Topics Concern    None   Social History Narrative    None     Social Determinants of Health     Financial Resource Strain:     Difficulty of Paying Living Expenses:    Food Insecurity:     Worried About Running Out of Food in the Last Year:     Ran Out of Food in the Last Year:    Transportation Needs:     Lack of Transportation (Medical):      Lack of Transportation (Non-Medical):    Physical Activity:     Days of Exercise per Week:     Minutes of Exercise per Session:    Stress:     Feeling of Stress :    Social Connections:     Frequency of Communication with Friends and Family:     Frequency of Social Gatherings with Friends and Family:     Attends Restorationist Services:     Active Member of Clubs or Organizations:     Attends Club or Organization Meetings:     Marital Status:    Intimate Partner Violence:     Fear of Current or Ex-Partner:     Emotionally Abused:     Physically Abused:     Sexually Abused:      Past Medical History:   Diagnosis Date    Active smoker     Anxiety     Asthma     Asthma  Balance problem     Bronchitis     Cervical radicular pain     CTS (carpal tunnel syndrome)     Depressive disorder     Fall at home     Headache     Migraine     Multiple allergies     Neck pain     Peripheral neuropathy     Pneumonia     Psoriasis        ROS     Negative for: Constitutional, Gastrointestinal, Neurological, Skin, Genitourinary, Musculoskeletal, HENT, Endocrine, Cardiovascular, Eyes, Respiratory, Psychiatric, Allergic/Imm, Heme/Lymph          Main Ophthalmology Exam     External Exam       Right Left    External Normal Normal          Slit Lamp Exam       Right Left    Lids/Lashes Normal Normal    Conjunctiva/Sclera White and quiet White and quiet    Cornea Clear Clear    Anterior Chamber Deep and quiet Deep and quiet    Iris Round and reactive Round and reactive    Lens Clear Clear    Vitreous Normal Normal          Fundus Exam       Right Left    Disc Normal Normal    C/D Ratio 0.2 0.2    Macula Normal Normal    Vessels Normal Normal                   Tonometry     Tonometry (Non-contact air puff, 10:50 AM)       Right Left    Pressure 14 18   IOP.7             14.2  CH:  9.6          7.3  WS: 8.1          5.6                  Not recorded         Not recorded          Visual Acuity (Snellen - Linear)       Right Left    Dist cc 20/25 -1 20/40    Near cc 20/50 OU     Correction: Glasses          Pupils     Pupils       Pupils    Right PERRL    Left PERRL              Neuro/Psych     Neuro/Psych     Oriented x3: Yes    Mood/Affect: Normal               Not recorded            Ophthalmology Exam     Wearing Rx       Sphere Cylinder Axis    Right +1.25 -0.75 079    Left +1.00 -0.75 071    Age: 2yrs    Type: SVL              Manifest Refraction     Manifest Refraction       Sphere Cylinder [place]    Right +0.75 -0.50 045 20/25+ +1.00     Left +0.75 -0.25 105 20/25+ +1.00 20/20ou          Manifest Refraction #2 (Auto)       Sphere Cylinder [place] Right +0.75 -0.50 042       Left +0.75 -0.25 109                  Final Rx       Sphere Cylinder Axis Add    Right +0.75 -0.50 045 +1.00    Left +0.75 -0.25 105 +1.00    Type: Bifocal    Expiration Date: 5/28/2023            No orders of the defined types were placed in this encounter. IMPRESSION:  1. Hyperopia of both eyes with astigmatism        PLAN:    1. New glasses recommended;   Patient to decide if wants bifocal or just single vision       Patient Instructions   New glasses     May fill single vision distance or with bifocal      Return in about 2 years (around 5/27/2023) for complete eye exam.

## 2021-06-30 ENCOUNTER — HOSPITAL ENCOUNTER (OUTPATIENT)
Age: 46
Setting detail: SPECIMEN
Discharge: HOME | End: 2021-06-30
Payer: COMMERCIAL

## 2021-06-30 ENCOUNTER — OFFICE VISIT (OUTPATIENT)
Dept: OBGYN CLINIC | Age: 46
End: 2021-06-30
Payer: COMMERCIAL

## 2021-06-30 VITALS
WEIGHT: 240 LBS | SYSTOLIC BLOOD PRESSURE: 112 MMHG | BODY MASS INDEX: 38.74 KG/M2 | DIASTOLIC BLOOD PRESSURE: 72 MMHG | HEART RATE: 63 BPM

## 2021-06-30 DIAGNOSIS — R68.82 DECREASED LIBIDO: ICD-10-CM

## 2021-06-30 DIAGNOSIS — N95.1 MENOPAUSAL SYMPTOMS: Primary | ICD-10-CM

## 2021-06-30 PROCEDURE — 36415 COLL VENOUS BLD VENIPUNCTURE: CPT | Performed by: OBSTETRICS & GYNECOLOGY

## 2021-06-30 PROCEDURE — 99213 OFFICE O/P EST LOW 20 MIN: CPT | Performed by: OBSTETRICS & GYNECOLOGY

## 2021-06-30 PROCEDURE — 4004F PT TOBACCO SCREEN RCVD TLK: CPT | Performed by: OBSTETRICS & GYNECOLOGY

## 2021-06-30 PROCEDURE — G8427 DOCREV CUR MEDS BY ELIG CLIN: HCPCS | Performed by: OBSTETRICS & GYNECOLOGY

## 2021-06-30 PROCEDURE — G8417 CALC BMI ABV UP PARAM F/U: HCPCS | Performed by: OBSTETRICS & GYNECOLOGY

## 2021-06-30 RX ORDER — PROGESTERONE 100 MG/1
100 CAPSULE ORAL NIGHTLY
Qty: 30 CAPSULE | Refills: 1 | Status: SHIPPED | OUTPATIENT
Start: 2021-06-30 | End: 2021-09-13

## 2021-06-30 RX ORDER — BUPROPION HYDROCHLORIDE 150 MG/1
TABLET, EXTENDED RELEASE ORAL
COMMUNITY
Start: 2021-06-26

## 2021-06-30 RX ORDER — ESTERIFIED ESTROGEN AND METHYLTESTOSTERONE 1.25; 2.5 MG/1; MG/1
1 TABLET ORAL DAILY
Qty: 30 TABLET | Refills: 1 | Status: SHIPPED | OUTPATIENT
Start: 2021-06-30 | End: 2021-09-16 | Stop reason: SDUPTHER

## 2021-06-30 NOTE — PROGRESS NOTES
DATE OF VISIT:  6/30/21    PATIENT NAME:  Lopez Gill     YOB: 1975    REASON FOR VISIT:    Chief Complaint   Patient presents with    Follow-up     Pt. is here for f/u on estratest. She started d/t hot falshes/night sweats, trouble sleeping, and decreased libido. it took awhile to get the medication d/t insurance issues but she has been taking it for almost a month. noticing improvement with hot flahses and night sweats. her libido isn't quite there and she is having mood issues. HISTORY OF PRESENT ILLNESS:  Pt feels that her hot flashes and night sweats are getting better but she just started the medication 3-4 weeks ago. Still having dec libido and mood swings. Patient's last menstrual period was 08/31/2020. Vitals:    06/30/21 1443   BP: 112/72   Site: Right Upper Arm   Position: Sitting   Pulse: 63   Weight: 240 lb (108.9 kg)     Body mass index is 38.74 kg/m². Allergies   Allergen Reactions    Dextromethorphan-Guaifenesin Rash     Other reaction(s): Dizziness    Aleve [Naproxen] Hives     Gets hives from Aleve and Excedrin migraine or any migraine medications but can take other naproxen products    Bee Venom Swelling    Ciprofloxacin Hives    Darvocet A500 [Propoxyphene N-Acetaminophen]      Hallucinate and sleep     Keflex [Cephalexin] Hives    Pcn [Penicillins] Hives    Zithromax [Azithromycin] Hives    Bactrim [Sulfamethoxazole-Trimethoprim] Hives and Rash    Oxycodone Rash     Current Outpatient Medications   Medication Sig Dispense Refill    buPROPion (WELLBUTRIN SR) 150 MG extended release tablet Take one tablet by mouth once a day for the first three days then increase to one tablet by mouth twice daily .  estrogens, conjugated,-methylTESTOSTERone (ESTRATEST) 1.25-2.5 MG per tablet Take 1 tablet by mouth daily.  30 tablet 1    progesterone (PROMETRIUM) 100 MG CAPS capsule Take 1 capsule by mouth nightly 30 capsule 1    Secukinumab (COSENTYX SC) Inject into the skin      topiramate (TOPAMAX) 50 MG tablet Take 50 mg by mouth 2 times daily      fluocinonide (LIDEX) 0.05 % cream APPLY CREAM EXTERNALLY TWICE DAILY      SUMAtriptan (IMITREX) 100 MG tablet Take 100 mg by mouth once as needed      esomeprazole (NEXIUM) 40 MG delayed release capsule Take 1 capsule by mouth once daily      hydrOXYzine (VISTARIL) 25 MG capsule Take 25 mg by mouth 3 times daily as needed for Itching      Elastic Bandages & Supports (ADJUSTABLE WRIST BRACE) [facility] Wear braces at night. Dx .0 2 each 0    albuterol (PROVENTIL HFA;VENTOLIN HFA) 108 (90 BASE) MCG/ACT inhaler Inhale 2 puffs into the lungs 4 times daily as needed.  cetirizine (ZYRTEC) 10 MG tablet Take 10 mg by mouth daily. No current facility-administered medications for this visit. Social History     Socioeconomic History    Marital status:      Spouse name: None    Number of children: None    Years of education: None    Highest education level: None   Occupational History    None   Tobacco Use    Smoking status: Current Every Day Smoker     Packs/day: 1.00     Years: 10.00     Pack years: 10.00    Smokeless tobacco: Never Used   Vaping Use    Vaping Use: Never used   Substance and Sexual Activity    Alcohol use: Yes     Comment: rarely    Drug use: Never    Sexual activity: Not Currently   Other Topics Concern    None   Social History Narrative    None     Social Determinants of Health     Financial Resource Strain:     Difficulty of Paying Living Expenses:    Food Insecurity:     Worried About Running Out of Food in the Last Year:     Ran Out of Food in the Last Year:    Transportation Needs:     Lack of Transportation (Medical):      Lack of Transportation (Non-Medical):    Physical Activity:     Days of Exercise per Week:     Minutes of Exercise per Session:    Stress:     Feeling of Stress :    Social Connections:     Frequency of Communication with Friends and Family:     Frequency of Social Gatherings with Friends and Family:     Attends Presybeterian Services:     Active Member of Clubs or Organizations:     Attends Club or Organization Meetings:     Marital Status:    Intimate Partner Violence:     Fear of Current or Ex-Partner:     Emotionally Abused:     Physically Abused:     Sexually Abused:        REVIEW OF SYSTEMS:  Review of Systems   Constitutional: Negative for chills and fever. Genitourinary: Negative for pelvic pain and vaginal discharge. Dec libido and mood swings; hot flashes better       PHYSICAL EXAM:  /72 (Site: Right Upper Arm, Position: Sitting)   Pulse 63   Wt 240 lb (108.9 kg)   LMP 08/31/2020   BMI 38.74 kg/m²   Physical Exam  Constitutional:       Appearance: Normal appearance. HENT:      Head: Normocephalic and atraumatic. Eyes:      Pupils: Pupils are equal, round, and reactive to light. Cardiovascular:      Rate and Rhythm: Normal rate. Pulmonary:      Effort: Pulmonary effort is normal.   Musculoskeletal:         General: Normal range of motion. Cervical back: Normal range of motion. Neurological:      Mental Status: She is alert and oriented to person, place, and time. Skin:     General: Skin is warm and dry. Psychiatric:         Mood and Affect: Mood normal.         Behavior: Behavior normal.         Thought Content: Thought content normal.         Judgment: Judgment normal.       The patient, Evelin Shelton is a 39 y.o. female, was seen with a total time spent of 20 minutes for the visit on this date of service by the E/M provider. The time component had both face to face and non face to face time spent in determining the total time component. Counseling and education regarding her diagnosis listed below and her options regarding those diagnoses were also included in determining her time component. Diagnosis Orders   1.  Menopausal symptoms  Estradiol    Testosterone, Free    estrogens, conjugated,-methylTESTOSTERone (ESTRATEST) 1.25-2.5 MG per tablet   2. Decreased libido  Testosterone, Free        The patient had her preventative health maintenance recommendations and follow-up reviewed with her at the completion of her visit. ASSESSMENT:      1. Menopausal symptoms    2.  Decreased libido        PLAN:  Orders Placed This Encounter   Procedures    Estradiol    Testosterone, Free     Return in about 6 weeks (around 8/11/2021) for annual.       Electronically signed by Olga Gill DO on 07/07/21

## 2021-07-01 DIAGNOSIS — R68.82 DECREASED LIBIDO: ICD-10-CM

## 2021-07-01 DIAGNOSIS — N95.1 MENOPAUSAL SYMPTOMS: ICD-10-CM

## 2021-07-01 LAB
ESTRADIOL LEVEL: 71 PG/ML (ref 27–314)
SEX HORMONE BINDING GLOBULIN: 34 NMOL/L (ref 30–135)
TESTOSTERONE FREE-NONMALE: 6.3 PG/ML (ref 1.1–5.8)
TESTOSTERONE TOTAL: 36 NG/DL (ref 20–70)

## 2021-07-06 ENCOUNTER — TELEPHONE (OUTPATIENT)
Dept: OBGYN CLINIC | Age: 46
End: 2021-07-06

## 2021-09-16 ENCOUNTER — OFFICE VISIT (OUTPATIENT)
Dept: OBGYN CLINIC | Age: 46
End: 2021-09-16
Payer: COMMERCIAL

## 2021-09-16 VITALS
SYSTOLIC BLOOD PRESSURE: 110 MMHG | BODY MASS INDEX: 37.93 KG/M2 | WEIGHT: 235 LBS | HEART RATE: 72 BPM | DIASTOLIC BLOOD PRESSURE: 73 MMHG

## 2021-09-16 DIAGNOSIS — N95.1 MENOPAUSAL SYMPTOMS: Primary | ICD-10-CM

## 2021-09-16 PROCEDURE — G8417 CALC BMI ABV UP PARAM F/U: HCPCS | Performed by: OBSTETRICS & GYNECOLOGY

## 2021-09-16 PROCEDURE — G8427 DOCREV CUR MEDS BY ELIG CLIN: HCPCS | Performed by: OBSTETRICS & GYNECOLOGY

## 2021-09-16 PROCEDURE — 99213 OFFICE O/P EST LOW 20 MIN: CPT | Performed by: OBSTETRICS & GYNECOLOGY

## 2021-09-16 PROCEDURE — 4004F PT TOBACCO SCREEN RCVD TLK: CPT | Performed by: OBSTETRICS & GYNECOLOGY

## 2021-09-16 RX ORDER — ESOMEPRAZOLE MAGNESIUM 40 MG/1
CAPSULE, DELAYED RELEASE ORAL
COMMUNITY
Start: 2021-07-11 | End: 2021-09-16

## 2021-09-16 RX ORDER — ESTERIFIED ESTROGEN AND METHYLTESTOSTERONE 1.25; 2.5 MG/1; MG/1
1 TABLET ORAL DAILY
Qty: 90 TABLET | Refills: 2 | Status: SHIPPED | OUTPATIENT
Start: 2021-09-16 | End: 2022-02-14 | Stop reason: SDUPTHER

## 2021-09-16 RX ORDER — PROGESTERONE 100 MG/1
100 CAPSULE ORAL NIGHTLY
Qty: 90 CAPSULE | Refills: 2 | Status: SHIPPED | OUTPATIENT
Start: 2021-09-16 | End: 2022-05-26 | Stop reason: SDUPTHER

## 2021-09-16 NOTE — PROGRESS NOTES
DATE OF VISIT:  9/16/21    PATIENT NAME:  Taryn Connors     YOB: 1975    REASON FOR VISIT:    Chief Complaint   Patient presents with    Menopause     Pt. here for medication f/u. She is on estratest and progesterone. Pt. has noticed an improvement in hot flashes. HISTORY OF PRESENT ILLNESS:  Pt states that she is feeling much better with the addition of hrt; progesterone helping with night sweats/insomnia      Patient's last menstrual period was 08/31/2020. Vitals:    09/16/21 1420   BP: 110/73   Site: Left Upper Arm   Position: Sitting   Pulse: 72   Weight: 235 lb (106.6 kg)     Body mass index is 37.93 kg/m². Allergies   Allergen Reactions    Dextromethorphan-Guaifenesin Rash     Other reaction(s): Dizziness    Aleve [Naproxen] Hives     Gets hives from Aleve and Excedrin migraine or any migraine medications but can take other naproxen products    Bee Venom Swelling    Ciprofloxacin Hives    Darvocet A500 [Propoxyphene N-Acetaminophen]      Hallucinate and sleep     Keflex [Cephalexin] Hives    Pcn [Penicillins] Hives    Zithromax [Azithromycin] Hives    Bactrim [Sulfamethoxazole-Trimethoprim] Hives and Rash    Oxycodone Rash     Current Outpatient Medications   Medication Sig Dispense Refill    estrogens, conjugated,-methylTESTOSTERone (ESTRATEST) 1.25-2.5 MG per tablet Take 1 tablet by mouth daily. 90 tablet 2    progesterone (PROMETRIUM) 100 MG CAPS capsule Take 1 capsule by mouth nightly 90 capsule 2    buPROPion (WELLBUTRIN SR) 150 MG extended release tablet Take one tablet by mouth once a day for the first three days then increase to one tablet by mouth twice daily .       Secukinumab (COSENTYX SC) Inject into the skin      topiramate (TOPAMAX) 50 MG tablet Take 50 mg by mouth 2 times daily      fluocinonide (LIDEX) 0.05 % cream APPLY CREAM EXTERNALLY TWICE DAILY      SUMAtriptan (IMITREX) 100 MG tablet Take 100 mg by mouth once as needed      esomeprazole SYSTEMS:  Review of Systems   Constitutional: Negative for chills and fever. Genitourinary: Negative for vaginal pain. PHYSICAL EXAM:  /73 (Site: Left Upper Arm, Position: Sitting)   Pulse 72   Wt 235 lb (106.6 kg)   LMP 08/31/2020   BMI 37.93 kg/m²   Physical Exam  Constitutional:       Appearance: Normal appearance. HENT:      Head: Normocephalic and atraumatic. Eyes:      Extraocular Movements: Extraocular movements intact. Pupils: Pupils are equal, round, and reactive to light. Cardiovascular:      Rate and Rhythm: Normal rate. Pulmonary:      Effort: Pulmonary effort is normal.   Musculoskeletal:         General: Normal range of motion. Cervical back: Normal range of motion. Neurological:      Mental Status: She is alert and oriented to person, place, and time. Skin:     General: Skin is warm and dry. Psychiatric:         Mood and Affect: Mood normal.         Behavior: Behavior normal.         Thought Content: Thought content normal.         Judgment: Judgment normal.       The patient, Herbert Lacey is a 39 y.o. female, was seen with a total time spent of 20 minutes for the visit on this date of service by the E/M provider. The time component had both face to face and non face to face time spent in determining the total time component. Counseling and education regarding her diagnosis listed below and her options regarding those diagnoses were also included in determining her time component. Diagnosis Orders   1. Menopausal symptoms  estrogens, conjugated,-methylTESTOSTERone (ESTRATEST) 1.25-2.5 MG per tablet        The patient had her preventative health maintenance recommendations and follow-up reviewed with her at the completion of her visit. ASSESSMENT:      1. Menopausal symptoms        PLAN:  No orders of the defined types were placed in this encounter.     Return in about 8 months (around 5/16/2022) for annual.       Electronically signed by Sabi Perdomo Yon Fjaardo DO on 09/27/21

## 2022-02-14 DIAGNOSIS — N95.1 MENOPAUSAL SYMPTOMS: ICD-10-CM

## 2022-02-14 RX ORDER — ESTERIFIED ESTROGEN AND METHYLTESTOSTERONE 1.25; 2.5 MG/1; MG/1
1 TABLET ORAL DAILY
Qty: 90 TABLET | Refills: 1 | Status: SHIPPED | OUTPATIENT
Start: 2022-02-14 | End: 2022-05-26 | Stop reason: SDUPTHER

## 2022-02-14 NOTE — TELEPHONE ENCOUNTER
Received a fax from [place] that patient is needing refills of Estratest.  She is scheduled for an annual 5/26/22. Ok to refill until that visit?

## 2022-02-15 ENCOUNTER — HOSPITAL ENCOUNTER (EMERGENCY)
Age: 47
Discharge: HOME | End: 2022-02-15
Attending: EMERGENCY MEDICINE
Payer: COMMERCIAL

## 2022-02-15 ENCOUNTER — APPOINTMENT (OUTPATIENT)
Dept: GENERAL RADIOLOGY | Age: 47
End: 2022-02-15
Payer: COMMERCIAL

## 2022-02-15 VITALS
HEIGHT: 66 IN | RESPIRATION RATE: 16 BRPM | OXYGEN SATURATION: 96 % | HEART RATE: 68 BPM | BODY MASS INDEX: 38.89 KG/M2 | TEMPERATURE: 96.7 F | SYSTOLIC BLOOD PRESSURE: 145 MMHG | WEIGHT: 242 LBS | DIASTOLIC BLOOD PRESSURE: 75 MMHG

## 2022-02-15 DIAGNOSIS — R42 LIGHTHEADEDNESS: Primary | ICD-10-CM

## 2022-02-15 LAB
ABSOLUTE EOS #: 0.09 K/UL (ref 0–0.44)
ABSOLUTE IMMATURE GRANULOCYTE: <0.03 K/UL (ref 0–0.3)
ABSOLUTE LYMPH #: 1.79 K/UL (ref 1.1–3.7)
ABSOLUTE MONO #: 0.36 K/UL (ref 0.1–1.2)
ANION GAP SERPL CALCULATED.3IONS-SCNC: 10 MMOL/L (ref 9–17)
BASOPHILS # BLD: 1 % (ref 0–2)
BASOPHILS ABSOLUTE: <0.03 K/UL (ref 0–0.2)
BUN BLDV-MCNC: 14 MG/DL (ref 6–20)
BUN/CREAT BLD: 15 (ref 9–20)
CALCIUM SERPL-MCNC: 8.9 MG/DL (ref 8.6–10.4)
CHLORIDE BLD-SCNC: 97 MMOL/L (ref 98–107)
CO2: 26 MMOL/L (ref 20–31)
CREAT SERPL-MCNC: 0.92 MG/DL (ref 0.5–0.9)
DIFFERENTIAL TYPE: ABNORMAL
EOSINOPHILS RELATIVE PERCENT: 2 % (ref 1–4)
GFR AFRICAN AMERICAN: >60 ML/MIN
GFR NON-AFRICAN AMERICAN: >60 ML/MIN
GFR SERPL CREATININE-BSD FRML MDRD: ABNORMAL ML/MIN/{1.73_M2}
GFR SERPL CREATININE-BSD FRML MDRD: ABNORMAL ML/MIN/{1.73_M2}
GLUCOSE BLD-MCNC: 111 MG/DL (ref 70–99)
HCT VFR BLD CALC: 42.7 % (ref 36.3–47.1)
HEMOGLOBIN: 14.6 G/DL (ref 11.9–15.1)
IMMATURE GRANULOCYTES: 1 %
LYMPHOCYTES # BLD: 47 % (ref 24–43)
MCH RBC QN AUTO: 31.6 PG (ref 25.2–33.5)
MCHC RBC AUTO-ENTMCNC: 34.2 G/DL (ref 25.2–33.5)
MCV RBC AUTO: 92.4 FL (ref 82.6–102.9)
MONOCYTES # BLD: 10 % (ref 3–12)
NRBC AUTOMATED: 0 PER 100 WBC
PDW BLD-RTO: 12.9 % (ref 11.8–14.4)
PLATELET # BLD: 185 K/UL (ref 138–453)
PLATELET ESTIMATE: ABNORMAL
PMV BLD AUTO: 11.1 FL (ref 8.1–13.5)
POTASSIUM SERPL-SCNC: 3.9 MMOL/L (ref 3.7–5.3)
RBC # BLD: 4.62 M/UL (ref 3.95–5.11)
RBC # BLD: ABNORMAL 10*6/UL
SEG NEUTROPHILS: 39 % (ref 36–65)
SEGMENTED NEUTROPHILS ABSOLUTE COUNT: 1.45 K/UL (ref 1.5–8.1)
SODIUM BLD-SCNC: 133 MMOL/L (ref 135–144)
TROPONIN INTERP: NORMAL
TROPONIN T: NORMAL NG/ML
TROPONIN, HIGH SENSITIVITY: <6 NG/L (ref 0–14)
WBC # BLD: 3.7 K/UL (ref 3.5–11.3)
WBC # BLD: ABNORMAL 10*3/UL

## 2022-02-15 PROCEDURE — 84484 ASSAY OF TROPONIN QUANT: CPT

## 2022-02-15 PROCEDURE — 85025 COMPLETE CBC W/AUTO DIFF WBC: CPT

## 2022-02-15 PROCEDURE — 80048 BASIC METABOLIC PNL TOTAL CA: CPT

## 2022-02-15 PROCEDURE — 99285 EMERGENCY DEPT VISIT HI MDM: CPT

## 2022-02-15 PROCEDURE — 93005 ELECTROCARDIOGRAM TRACING: CPT | Performed by: EMERGENCY MEDICINE

## 2022-02-15 PROCEDURE — 71045 X-RAY EXAM CHEST 1 VIEW: CPT

## 2022-02-15 PROCEDURE — 36415 COLL VENOUS BLD VENIPUNCTURE: CPT

## 2022-02-16 LAB
EKG ATRIAL RATE: 70 BPM
EKG P AXIS: 35 DEGREES
EKG P-R INTERVAL: 164 MS
EKG Q-T INTERVAL: 418 MS
EKG QRS DURATION: 86 MS
EKG QTC CALCULATION (BAZETT): 451 MS
EKG R AXIS: 24 DEGREES
EKG T AXIS: 15 DEGREES
EKG VENTRICULAR RATE: 70 BPM

## 2022-02-16 NOTE — ED PROVIDER NOTES
eMERGENCY dEPARTMENT eNCOUnter      Pt Name: Cassidy Blanco  MRN: [id number]  [place] 1975  Date of evaluation: 2/15/2022      CHIEF COMPLAINT       Chief Complaint   Patient presents with    Dizziness         HISTORY OF PRESENT ILLNESS    Cassidy Blanco is a 55 y.o. female who presents with dizziness. Patient states she has been having these episodes feels lightheaded at times she denies any chest pain or shortness of breath versus does occasionally have this burning across her entire chest which lasts brief amount time it goes away she denies any associated nausea shortness of breath diaphoresis no exacerbating relieving factors. REVIEW OF SYSTEMS       Review of systems are all reviewed and negative except stated above in HPI    PAST MEDICAL HISTORY    has a past medical history of Active smoker, Anxiety, Asthma, Asthma, Balance problem, Bronchitis, Cervical radicular pain, CTS (carpal tunnel syndrome), Depressive disorder, Fall at home, Headache, Migraine, Multiple allergies, Neck pain, Peripheral neuropathy, Pneumonia, and Psoriasis. SURGICAL HISTORY      has a past surgical history that includes Tonsillectomy; Tubal ligation; Cervix lesion destruction; Tympanostomy tube placement; Endometrial ablation; Carpal tunnel release (Bilateral); Hysterectomy, vaginal (N/A, 01/11/2021); and Ovary removal (Bilateral, 01/11/2021). CURRENT MEDICATIONS       Discharge Medication List as of 2/15/2022 10:29 PM      CONTINUE these medications which have NOT CHANGED    Details   estrogens, conjugated,-methylTESTOSTERone (ESTRATEST) 1.25-2.5 MG per tablet Take 1 tablet by mouth daily. , Disp-90 tablet, R-1Normal      progesterone (PROMETRIUM) 100 MG CAPS capsule Take 1 capsule by mouth nightly, Disp-90 capsule, R-2Normal      buPROPion (WELLBUTRIN SR) 150 MG extended release tablet Take one tablet by mouth once a day for the first three days then increase to one tablet by mouth twice daily . Historical Med Secukinumab (COSENTYX SC) Inject into the skinHistorical Med      topiramate (TOPAMAX) 50 MG tablet Take 50 mg by mouth 2 times dailyHistorical Med      fluocinonide (LIDEX) 0.05 % cream APPLY CREAM EXTERNALLY TWICE DAILY, Historical Med      SUMAtriptan (IMITREX) 100 MG tablet Take 100 mg by mouth once as neededHistorical Med      esomeprazole (NEXIUM) 40 MG delayed release capsule Take 1 capsule by mouth once dailyHistorical Med      Elastic Bandages & Supports (ADJUSTABLE WRIST BRACE) MISC Disp-2 each, R-0, PrintWear braces at night. Dx .0      albuterol (PROVENTIL HFA;VENTOLIN HFA) 108 (90 BASE) MCG/ACT inhaler Inhale 2 puffs into the lungs 4 times daily as needed. Historical Med      cetirizine (ZYRTEC) 10 MG tablet Take 10 mg by mouth daily. Historical Med             ALLERGIES     is allergic to dextromethorphan-guaifenesin, aleve [naproxen], bee venom, ciprofloxacin, darvocet a500 [propoxyphene n-acetaminophen], keflex [cephalexin], pcn [penicillins], zithromax [azithromycin], bactrim [sulfamethoxazole-trimethoprim], and oxycodone. FAMILY HISTORY     She indicated that the status of her mother is unknown. She indicated that the status of her father is unknown.     family history includes Alzheimer's Disease in an other family member; Asthma in her father; Cancer in her father, mother, and another family member; Elevated Lipids in her father, mother, and another family member; Heart Disease in her father and another family member; Hypertension in her father, mother, and another family member; Lupus in her mother; Mult Sclerosis in an other family member; Other in her mother; Seizures in her father; Stroke in her mother and another family member; Thyroid Disease in an other family member. SOCIAL HISTORY      reports that she has been smoking. She has a 10.00 pack-year smoking history. She has never used smokeless tobacco. She reports current alcohol use.  She reports that she does not use drugs.    PHYSICAL EXAM     INITIAL VITALS:  height is 5' 6\" (1.676 m) and weight is 242 lb (109.8 kg). Her tympanic temperature is 96.7 °F (35.9 °C). Her blood pressure is 145/75 (abnormal) and her pulse is 68. Her respiration is 16 and oxygen saturation is 96%. General: Patient alert nontoxic-appearing female in no apparent distress  HEENT: Head is atraumatic conjunctiva clear  Neck: Supple  Respiratory: Lung sounds are clear bilateral  Cardiac: Heart is regular rate and rhythm  GI: Abdomen soft nontender  Neuro: Patient has no gross focal neurological deficits at bedside exam    DIFFERENTIAL DIAGNOSIS/ MDM:     Obtain EKG chest x-ray and labs    DIAGNOSTIC RESULTS     EKG: All EKG's are interpreted by the Emergency Department Physician who either signs or Co-signs this chart in the absence of a cardiologist.    EKG interpreted by me reveals normal sinus rhythm rate of 70 with no acute ST elevations depressions normal DC and QRS normal axis    RADIOLOGY:   I directly visualized the following  images and reviewed the radiologist interpretations:  XR CHEST PORTABLE   Final Result   No acute process.                LABS:  Labs Reviewed   CBC WITH AUTO DIFFERENTIAL - Abnormal; Notable for the following components:       Result Value    MCHC 34.2 (*)     Lymphocytes 47 (*)     Immature Granulocytes 1 (*)     Segs Absolute 1.45 (*)     All other components within normal limits   BASIC METABOLIC PANEL - Abnormal; Notable for the following components:    Glucose 111 (*)     CREATININE 0.92 (*)     Sodium 133 (*)     Chloride 97 (*)     All other components within normal limits   TROPONIN         EMERGENCY DEPARTMENT COURSE:   Vitals:    Vitals:    02/15/22 2106 02/15/22 2130 02/15/22 2200 02/15/22 2231   BP: (!) 176/84 (!) 172/84 (!) 161/79 (!) 145/75   Pulse: 87 68 70 68   Resp: 18 18 18 16   Temp: 96.7 °F (35.9 °C)      TempSrc: Tympanic      SpO2: 98% 95% 97% 96%   Weight: 242 lb (109.8 kg)      Height: 5' 6\"

## 2022-04-04 ENCOUNTER — OFFICE VISIT (OUTPATIENT)
Dept: OPTOMETRY | Age: 47
End: 2022-04-04
Payer: COMMERCIAL

## 2022-04-04 DIAGNOSIS — H52.4 HYPEROPIA OF BOTH EYES WITH ASTIGMATISM AND PRESBYOPIA: ICD-10-CM

## 2022-04-04 DIAGNOSIS — H52.203 HYPEROPIA OF BOTH EYES WITH ASTIGMATISM AND PRESBYOPIA: ICD-10-CM

## 2022-04-04 DIAGNOSIS — H52.03 HYPEROPIA OF BOTH EYES WITH ASTIGMATISM AND PRESBYOPIA: ICD-10-CM

## 2022-04-04 PROCEDURE — 92014 COMPRE OPH EXAM EST PT 1/>: CPT | Performed by: OPTOMETRIST

## 2022-04-04 PROCEDURE — 92015 DETERMINE REFRACTIVE STATE: CPT | Performed by: OPTOMETRIST

## 2022-04-04 RX ORDER — EPINEPHRINE 0.3 MG/.3ML
INJECTION SUBCUTANEOUS
COMMUNITY
Start: 2022-01-20

## 2022-04-04 ASSESSMENT — REFRACTION_MANIFEST
OD_SPHERE: +0.50
OS_CYLINDER: -0.25
OS_AXIS: 090
OS_ADD: +1.50
OD_CYLINDER: DS
OS_SPHERE: +0.75
OD_ADD: +1.50

## 2022-04-04 ASSESSMENT — ENCOUNTER SYMPTOMS
RESPIRATORY NEGATIVE: 0
EYES NEGATIVE: 0
GASTROINTESTINAL NEGATIVE: 0
ALLERGIC/IMMUNOLOGIC NEGATIVE: 0

## 2022-04-04 ASSESSMENT — VISUAL ACUITY
OS_CC: 20/20
CORRECTION_TYPE: GLASSES
METHOD: SNELLEN - LINEAR

## 2022-04-04 ASSESSMENT — REFRACTION_WEARINGRX
OS_ADD: +1.00
OD_SPHERE: +0.75
OS_CYLINDER: -0.25
OD_ADD: +1.00
OS_SPHERE: +0.75
OD_AXIS: 045
OD_CYLINDER: -0.50
OS_AXIS: 105
SPECS_TYPE: BIFOCAL

## 2022-04-04 ASSESSMENT — REFRACTION_CURRENTRX
OS_SPHERE: +0.75
OS_ADDL_SPECS: MED
OD_ADDL_SPECS: MED
OD_SPHERE: +0.50
OS_BRAND: AIR OPTIX MULTIFOCAL
OD_BRAND: AIR OPTIX MULTIFOCAL

## 2022-04-04 ASSESSMENT — KERATOMETRY
METHOD_AUTO_MANUAL: AUTOMATED
OD_AXISANGLE2_DEGREES: 160
OS_AXISANGLE_DEGREES: 107
OD_AXISANGLE_DEGREES: 070
OD_K2POWER_DIOPTERS: 40.25
OD_K1POWER_DIOPTERS: 39.75
OS_K2POWER_DIOPTERS: 40.25
OS_K1POWER_DIOPTERS: 40.00
OS_AXISANGLE2_DEGREES: 017

## 2022-04-04 ASSESSMENT — TONOMETRY
OD_IOP_MMHG: 15
OS_IOP_MMHG: 15
IOP_METHOD: NON-CONTACT AIR PUFF

## 2022-04-04 NOTE — PROGRESS NOTES
Lora Gong presents today for   Chief Complaint   Patient presents with    Blurred Vision    Vision Exam   .    HPI     Blurred Vision     Laterality: both eyes    Quality: blurred    Context: distance vision              Comments     Last Vision Exam: 5/27/2021 AW  Last Ophthalmology Exam: 2019 Dr. Stacy Velazquez Rx: 5/27/2021  Insurance: Pete Loza  Update: Contacts  First time fit for contacts ; not elig for fit with ins  Distance is now a little worse and the near is really bad without the glasses                    ROS     Negative for: Constitutional, Gastrointestinal, Neurological, Skin, Genitourinary, Musculoskeletal, HENT, Endocrine, Cardiovascular, Eyes, Respiratory, Psychiatric, Allergic/Imm, Heme/Lymph          Family History   Problem Relation Age of Onset    Stroke Mother     Hypertension Mother    [facility] Elevated Lipids Mother     Cancer Mother         uterine    Lupus Mother     Other Mother         myasthenia gravis    Heart Disease Father     Hypertension Father     Elevated Lipids Father     Seizures Father     Asthma Father     Cancer Father         skin    Heart Disease Other     Stroke Other     Hypertension Other     Elevated Lipids Other     Thyroid Disease Other     Alzheimer's Disease Other     Cancer Other         lung/skin/colon    Mult Sclerosis Other        Social History     Socioeconomic History    Marital status:      Spouse name: None    Number of children: None    Years of education: None    Highest education level: None   Occupational History    None   Tobacco Use    Smoking status: Current Every Day Smoker     Packs/day: 1.00     Years: 10.00     Pack years: 10.00    Smokeless tobacco: Never Used   Vaping Use    Vaping Use: Never used   Substance and Sexual Activity    Alcohol use: Yes     Comment: rarely    Drug use: Never    Sexual activity: Not Currently   Other Topics Concern    None   Social History Narrative    None Social Determinants of Health     Financial Resource Strain:     Difficulty of Paying Living Expenses: Not on file   Food Insecurity:     Worried About Running Out of Food in the Last Year: Not on file    Rekha of Food in the Last Year: Not on file   Transportation Needs:     Lack of Transportation (Medical): Not on file    Lack of Transportation (Non-Medical):  Not on file   Physical Activity:     Days of Exercise per Week: Not on file    Minutes of Exercise per Session: Not on file   Stress:     Feeling of Stress : Not on file   Social Connections:     Frequency of Communication with Friends and Family: Not on file    Frequency of Social Gatherings with Friends and Family: Not on file    Attends [employer] Services: Not on file    Active Member of [address] [place] [address] Secret Sales or Organizations: Not on file    Attends Club or Organization Meetings: Not on file    Marital Status: Not on file   Intimate Partner Violence:     Fear of Current or Ex-Partner: Not on file    Emotionally Abused: Not on file    Physically Abused: Not on file    Sexually Abused: Not on file   Housing Stability:     Unable to Pay for Housing in the Last Year: Not on file    Number of [place] in the Last Year: Not on file    Unstable Housing in the Last Year: Not on file     Past Medical History:   Diagnosis Date    Active smoker     Anxiety     Asthma     Asthma     Balance problem     Bronchitis     Cervical radicular pain     CTS (carpal tunnel syndrome)     Depressive disorder     Fall at home     Headache     Migraine     Multiple allergies     Neck pain     Peripheral neuropathy     Pneumonia     Psoriasis        Neuro/Psych     Neuro/Psych     Oriented x3: Yes    Mood/Affect: Normal                Main Ophthalmology Exam     External Exam       Right Left    External Normal Normal             <div id=\"MAIN_EXAM_REVIEWED\"></div>     Tonometry     Tonometry (Non-contact air puff, 2:01 PM)       Right Left Pressure 15 15      Right / Left  IOPg 12.1 / 12.0  CH 8.4 / 8.2  WS 9.2 / 7.0    ]                     Visual Acuity (Snellen - Linear)       Right Left    Dist cc 20/20 20/20    Correction: Glasses        Keratometry     Keratometry (Automated)       K1 Axis K2 Axis    Right 39.75 160 40.25 070    Left 40.00 017 40.25 107                Ophthalmology Exam     Wearing Rx       Sphere Cylinder Axis Add    Right +0.75 -0.50 045 +1.00    Left +0.75 -0.25 105 +1.00    Age: 1yr    Type: Bifocal                Manifest Refraction     Manifest Refraction       Sphere Cylinder Axis Dist VA Add    Right +0.50 ds  20/20 +1.50    Left +0.75 -0.25 090 20/20 +1.50          Manifest Refraction #2 (Auto)       Sphere Cylinder Axis Dist VA Add    Right +1.00 -0.25 076      Left +1.00 -0.25 084                   Final Rx       Sphere Cylinder Axis Add    Right +0.50 ds  +1.50    Left +0.75 -0.25 090 +1.50    Type: Bifocal    Expiration Date: 4/5/2023           Contact Lens Current Rx     Current Contact Lens Rx (Trial Lens)       Brand Sphere Addl. Specs    Right air optix multifocal  +0.50 med     Left air optix multifocal  +0.75 med    Needs class $95                  2 weeks follow up needed;  New fit $95        IMPRESSION:  1. Hyperopia of both eyes with astigmatism and presbyopia        PLAN:    1. New glasses recommended  2. New contact lens fit ; bifocal fit;  Patient counseled that there will be a compromise in the vision when wearing contact lenses with bifocals      There are no Patient Instructions on file for this visit. Return in about 2 weeks (around 4/18/2022) for contact lens follow-up; after class .

## 2022-04-08 ENCOUNTER — TELEPHONE (OUTPATIENT)
Dept: OPTOMETRY | Age: 47
End: 2022-04-08

## 2022-04-11 NOTE — TELEPHONE ENCOUNTER
Patient needs to keep her 2 week follow up appointment. She was fit in a bifocal contact lens so it may not be as clear as with her glasses. If we have a cancel we can call her to get her in sooner, otherwise keep the appointment on 4/25/2022.

## 2022-04-25 ENCOUNTER — OFFICE VISIT (OUTPATIENT)
Dept: OPTOMETRY | Age: 47
End: 2022-04-25
Payer: COMMERCIAL

## 2022-04-25 DIAGNOSIS — H52.03 HYPEROPIA OF BOTH EYES WITH ASTIGMATISM AND PRESBYOPIA: Primary | ICD-10-CM

## 2022-04-25 DIAGNOSIS — H52.203 HYPEROPIA OF BOTH EYES WITH ASTIGMATISM AND PRESBYOPIA: Primary | ICD-10-CM

## 2022-04-25 DIAGNOSIS — H52.4 HYPEROPIA OF BOTH EYES WITH ASTIGMATISM AND PRESBYOPIA: Primary | ICD-10-CM

## 2022-04-25 PROCEDURE — 99999 PR OFFICE/OUTPT VISIT,PROCEDURE ONLY: CPT | Performed by: OPTOMETRIST

## 2022-04-25 PROCEDURE — 99211 OFF/OP EST MAY X REQ PHY/QHP: CPT

## 2022-04-25 ASSESSMENT — REFRACTION_WEARINGRX
OD_CYLINDER: DS
OD_SPHERE: +0.50
OS_ADD: +1.50
OS_SPHERE: +0.75
OS_AXIS: 090
OD_ADD: +1.50
OS_CYLINDER: -0.25
SPECS_TYPE: BIFOCAL

## 2022-04-25 ASSESSMENT — REFRACTION_MANIFEST
OD_ADD: +1.50
OS_ADD: +1.50
OS_SPHERE: +0.75
OD_SPHERE: +0.50
OS_AXIS: 090
OD_CYLINDER: DS
OS_CYLINDER: -0.25

## 2022-04-25 ASSESSMENT — VISUAL ACUITY
CORRECTION_TYPE: CONTACTS
METHOD: SNELLEN - LINEAR

## 2022-04-25 NOTE — PROGRESS NOTES
Fito Melo presents today for   Chief Complaint   Patient presents with    Blurred Vision    Follow-up   . HPI     Blurred Vision     Laterality: both eyes    Quality: blurred    Context: distance vision and reading              Comments     Contact lens check    Doing good getting in and out ; likes the fit and feel of them. Vision is all blurry both distance and reading                       Visual Acuity (Snellen - Linear)       Right Left    Dist cc 20/100 OU Doubled     Near cc 20/400 OU Doubled     Correction: Contacts          Contact Lens Current Rx     Current Contact Lens Rx       Brand Sphere Addl. Specs    Right air optix multifocal  +0.50 med     Left air optix multifocal  +0.75 med           Current Contact Lens Rx #2       Brand Sphere Addl. Specs    Right b and l ultra  +0.25 low     Left b and l ultra  +0.50 low           Current Contact Lens Rx #3       Brand Sphere Addl. Specs    Right b and l ultra  +0.25 low     Left b and l ultra  +0.25 low               Insertion in the office rx #2 20/30- in the distance on 20/40 in the near within 2 minutes of insertion   FINAL RX  Final Contact Lens Rx       Brand Sphere Addl. Specs    Right b and l ultra  +0.25 low     Left b and l ultra  +0.25 low     Expiration Date: 4/26/2023    Replacement: Monthly    Wearing Schedule: Daily wear       changed Rx to +.25 low in the left eye 5/5/2022 per patient phone call   ORDER call to order or schedule follow up ;  (may raise to high bifocal if needed        1. Hyperopia of both eyes with astigmatism and presbyopia         There are no Patient Instructions on file for this visit. No follow-ups on file.

## 2022-05-04 ENCOUNTER — TELEPHONE (OUTPATIENT)
Dept: OPTOMETRY | Age: 47
End: 2022-05-04

## 2022-05-04 NOTE — TELEPHONE ENCOUNTER
Patient called in and stated that she is having a hard time with her left contact. States it is causing blurred vision and giving her headaches. If she takes it out and wears just the right contacts its ok, but still blurry, but not as bad as if she were to wear both lenses. Right contact lens is great! How would you like to proceed?

## 2022-05-05 ASSESSMENT — REFRACTION_CURRENTRX
OD_SPHERE: +0.50
OS_BRAND: AIR OPTIX MULTIFOCAL
OS_ADDL_SPECS: MED
OD_ADDL_SPECS: MED
OS_SPHERE: +0.75
OD_BRAND: AIR OPTIX MULTIFOCAL

## 2022-05-12 NOTE — PROGRESS NOTES
YEARLY PHYSICAL    Date of service: 2022    Naseem Babcock  Is a 55 y.o.   female    PT's PCP is: Rere Barajas, AUBREY, APRN - CNP     : 1975                                         Chaperone for Intimate Exam   Chaperone was offered as part of the rooming process. Patient declined and agrees to continue with exam without a chaperone.  Chaperone: na      Subjective:       Patient's last menstrual period was 2020. Are your menses regular: not applicable    OB History    Para Term  AB Living   3 3 3 0 0 3   SAB IAB Ectopic Molar Multiple Live Births                    # Outcome Date GA Lbr Jimbo/2nd Weight Sex Delivery Anes PTL Lv   3 Term            2 Term            1 Term                 Social History     Tobacco Use   Smoking Status Former Smoker    Packs/day: 1.00    Years: 10.00    Pack years: 10.00    Quit date: 2022    Years since quittin.3   Smokeless Tobacco Never Used        Social History     Substance and Sexual Activity   Alcohol Use Yes    Comment: rarely       Family History   Problem Relation Age of Onset    Stroke Mother     Hypertension Mother     Elevated Lipids Mother     Cancer Mother         uterine    Lupus Mother     Other Mother         myasthenia gravis    Heart Disease Father     Hypertension Father     Elevated Lipids Father     Seizures Father     Asthma Father     Cancer Father         skin    Heart Disease Other     Stroke Other     Hypertension Other     Elevated Lipids Other     Thyroid Disease Other     Alzheimer's Disease Other     Cancer Other         lung/skin/colon    Mult Sclerosis Other     Breast Cancer Paternal Aunt     Lung Cancer Paternal Aunt     Breast Cancer Paternal Aunt     Colon Cancer Paternal Aunt        Any family history of breast or ovarian cancer: Yes.  2 paternal aunts-beast cancer    Any family history of blood clots: Yes-mom      Allergies: Dextromethorphan-guaifenesin, Aleve [naproxen], Bee venom, Ciprofloxacin, Darvocet a500 [propoxyphene n-acetaminophen], Keflex [cephalexin], Pcn [penicillins], Zithromax [azithromycin], Bactrim [sulfamethoxazole-trimethoprim], and Oxycodone      Current Outpatient Medications:     TREMFYA 100 MG/ML SOPN, NO DISPENSE, Disp: , Rfl:     estrogens, conjugated,-methylTESTOSTERone (ESTRATEST) 1.25-2.5 MG per tablet, Take 1 tablet by mouth daily. , Disp: 90 tablet, Rfl: 3    progesterone (PROMETRIUM) 100 MG CAPS capsule, Take 1 capsule by mouth nightly, Disp: 90 capsule, Rfl: 3    buPROPion (WELLBUTRIN SR) 150 MG extended release tablet, Take one tablet by mouth once a day for the first three days then increase to one tablet by mouth twice daily . , Disp: , Rfl:     esomeprazole (NEXIUM) 40 MG delayed release capsule, Take 1 capsule by mouth once daily, Disp: , Rfl:     albuterol (PROVENTIL HFA;VENTOLIN HFA) 108 (90 BASE) MCG/ACT inhaler, Inhale 2 puffs into the lungs 4 times daily as needed. , Disp: , Rfl:     cetirizine (ZYRTEC) 10 MG tablet, Take 10 mg by mouth daily. , Disp: , Rfl:     EPINEPHrine (EPIPEN) 0.3 MG/0.3ML SOAJ injection, INJECT 0.3ML INTO THE APPROPRIATE MUSCLE AS NEEDED FOR ALLERGIC REACTION (Patient not taking: Reported on 5/26/2022), Disp: , Rfl:     fluocinonide (LIDEX) 0.05 % cream, APPLY CREAM EXTERNALLY TWICE DAILY (Patient not taking: Reported on 5/26/2022), Disp: , Rfl:     SUMAtriptan (IMITREX) 100 MG tablet, Take 100 mg by mouth once as needed, Disp: , Rfl:     Social History     Substance and Sexual Activity   Sexual Activity Not Currently       Any bleeding or pain with intercourse: No    Last Yearly:  5-    Last pap: s/p hysterectomy    Last Mammogram: due    Do you do self breast exams: Yes    Past Medical History:   Diagnosis Date    Active smoker     Anxiety     Asthma     Asthma     Balance problem     Bronchitis     Cervical radicular pain     CTS (carpal tunnel syndrome)     Depressive disorder     Fall at home     Headache     Migraine     Multiple allergies     Neck pain     Peripheral neuropathy     Pneumonia     Psoriasis        Past Surgical History:   Procedure Laterality Date    CARPAL TUNNEL RELEASE Bilateral     CERVIX LESION DESTRUCTION      ENDOMETRIAL ABLATION      HYSTERECTOMY, VAGINAL N/A 01/11/2021    HYSTERECTOMY VAGINAL LAPAROSCOPIC ROBOTIC ASSISTED- BSO performed by Mirza Jimenez DO at [address] Bilateral 01/11/2021    TONSILLECTOMY      TUBAL LIGATION      TYMPANOSTOMY TUBE PLACEMENT         Family History   Problem Relation Age of Onset    Stroke Mother     Hypertension Mother    [place] Elevated Lipids Mother     Cancer Mother         uterine    Lupus Mother     Other Mother         myasthenia gravis    Heart Disease Father     Hypertension Father     Elevated Lipids Father     Seizures Father     Asthma Father     Cancer Father         skin    Heart Disease Other     Stroke Other     Hypertension Other     Elevated Lipids Other     Thyroid Disease Other     Alzheimer's Disease Other     Cancer Other         lung/skin/colon    Mult Sclerosis Other     Breast Cancer Paternal Aunt     Lung Cancer Paternal Aunt     Breast Cancer Paternal Aunt     Colon Cancer Paternal Aunt        Chief Complaint   Patient presents with    Annual Exam     Pt. quit smoking in Jan. when she got covid. Needs refill of hormones to [facility] in [place].  Menopause     Pt. reports that her hormones were working great until she had Covid in Jan.  She is having hot flashes, insomnia, and mood swings. PE:  Vital Signs  Blood pressure 118/76, weight 252 lb (114.3 kg), last menstrual period 08/31/2020, not currently breastfeeding. Estimated body mass index is 40.67 kg/m² as calculated from the following:    Height as of 2/15/22: 5' 6\" (1.676 m).     Weight as of this encounter: 252 lb (114.3 kg). Labs:    No results found for this visit on 05/26/22. No data recorded    NURSE: Sal Castro    HPI: here for annual exam - frustrated with wt gain    Review of Systems   Constitutional: Positive for unexpected weight change (gain). Negative for chills, fatigue and fever. Respiratory: Negative for shortness of breath. Cardiovascular: Negative for chest pain. Gastrointestinal: Negative for abdominal pain, constipation and diarrhea. Genitourinary: Negative for dysuria, enuresis, frequency, menstrual problem, pelvic pain, urgency and vaginal bleeding. Neurological: Negative for dizziness, light-headedness and headaches. Psychiatric/Behavioral: Positive for sleep disturbance. Physical Exam  Constitutional:       General: She is not in acute distress. Appearance: Normal appearance. She is not ill-appearing. Genitourinary:      Vulva normal.      Vaginal cuff intact. No vaginal discharge. No vaginal prolapse present. No vaginal atrophy present. Right Adnexa: not tender. Left Adnexa: not tender. Cervix is absent. Uterus is absent. Breasts:      Right: No mass, nipple discharge, skin change or tenderness. Left: No mass, nipple discharge, skin change or tenderness. HENT:      Head: Normocephalic. Cardiovascular:      Rate and Rhythm: Normal rate and regular rhythm. Pulmonary:      Effort: Pulmonary effort is normal.      Breath sounds: Normal breath sounds. Abdominal:      Palpations: Abdomen is soft. Tenderness: There is no abdominal tenderness. There is no guarding or rebound. Musculoskeletal:         General: Normal range of motion. Neurological:      General: No focal deficit present. Mental Status: She is alert. Psychiatric:         Mood and Affect: Mood normal.         Behavior: Behavior normal.                                   Assessment and Plan          Diagnosis Orders   1.  Women's annual routine gynecological examination     2. Menopausal symptoms  estrogens, conjugated,-methylTESTOSTERone (ESTRATEST) 1.25-2.5 MG per tablet    progesterone (PROMETRIUM) 100 MG CAPS capsule    Estradiol   3. Weight gain  TSH    T4, Free    Hemoglobin A1C       Repeat Annual every 1 year  Cervical Cytology Evaluation begins at 24years old. If Negative Cytology, Follow-up screening per current guidelines. Mammograms every 1year. If 35 yo and last mammogram was negative. Routine healthmaintenance per patients PCP. I am having Michelle Graf maintain her albuterol sulfate HFA, cetirizine, esomeprazole, fluocinonide, SUMAtriptan, buPROPion, EPINEPHrine, Tremfya, estrogens (conjugated)-methylTESTOSTERone, and progesterone. Return in about 1 year (around 5/26/2023) for annual.    She was also counseled on her preventative health maintenance recommendations and follow-up. There are no Patient Instructions on file for this visit.     KIMI Sifuentes,8/63/3527 2:48 PM

## 2022-05-26 ENCOUNTER — OFFICE VISIT (OUTPATIENT)
Dept: OBGYN CLINIC | Age: 47
End: 2022-05-26
Payer: COMMERCIAL

## 2022-05-26 ENCOUNTER — HOSPITAL ENCOUNTER (OUTPATIENT)
Age: 47
Setting detail: SPECIMEN
Discharge: HOME | End: 2022-05-26

## 2022-05-26 VITALS — WEIGHT: 252 LBS | SYSTOLIC BLOOD PRESSURE: 118 MMHG | BODY MASS INDEX: 40.67 KG/M2 | DIASTOLIC BLOOD PRESSURE: 76 MMHG

## 2022-05-26 DIAGNOSIS — Z12.31 BREAST CANCER SCREENING BY MAMMOGRAM: ICD-10-CM

## 2022-05-26 DIAGNOSIS — R63.5 WEIGHT GAIN: ICD-10-CM

## 2022-05-26 DIAGNOSIS — N95.1 MENOPAUSAL SYMPTOMS: ICD-10-CM

## 2022-05-26 DIAGNOSIS — Z01.419 WOMEN'S ANNUAL ROUTINE GYNECOLOGICAL EXAMINATION: Primary | ICD-10-CM

## 2022-05-26 LAB
ESTRADIOL LEVEL: 55.6 PG/ML (ref 27–314)
THYROXINE, FREE: 1.14 NG/DL (ref 0.93–1.7)
TSH SERPL DL<=0.05 MIU/L-ACNC: 1.17 UIU/ML (ref 0.3–5)

## 2022-05-26 PROCEDURE — 99396 PREV VISIT EST AGE 40-64: CPT | Performed by: OBSTETRICS & GYNECOLOGY

## 2022-05-26 RX ORDER — PROGESTERONE 100 MG/1
100 CAPSULE ORAL NIGHTLY
Qty: 90 CAPSULE | Refills: 3 | Status: SHIPPED | OUTPATIENT
Start: 2022-05-26

## 2022-05-26 RX ORDER — ESTERIFIED ESTROGEN AND METHYLTESTOSTERONE 1.25; 2.5 MG/1; MG/1
1 TABLET ORAL DAILY
Qty: 90 TABLET | Refills: 3 | Status: SHIPPED | OUTPATIENT
Start: 2022-05-26 | End: 2023-05-26

## 2022-05-26 RX ORDER — GUSELKUMAB 100 MG/ML
INJECTION SUBCUTANEOUS
COMMUNITY
Start: 2022-03-10 | End: 2022-06-16

## 2022-05-26 ASSESSMENT — ENCOUNTER SYMPTOMS
ABDOMINAL PAIN: 0
CONSTIPATION: 0
SHORTNESS OF BREATH: 0
DIARRHEA: 0

## 2022-05-27 LAB
ESTIMATED AVERAGE GLUCOSE: 91 MG/DL
HBA1C MFR BLD: 4.8 % (ref 4–6)

## 2022-06-16 ENCOUNTER — OFFICE VISIT (OUTPATIENT)
Dept: OBGYN CLINIC | Age: 47
End: 2022-06-16
Payer: COMMERCIAL

## 2022-06-16 ENCOUNTER — HOSPITAL ENCOUNTER (OUTPATIENT)
Age: 47
Setting detail: SPECIMEN
Discharge: HOME | End: 2022-06-16

## 2022-06-16 VITALS
BODY MASS INDEX: 39.66 KG/M2 | DIASTOLIC BLOOD PRESSURE: 72 MMHG | SYSTOLIC BLOOD PRESSURE: 110 MMHG | HEIGHT: 66 IN | WEIGHT: 246.8 LBS

## 2022-06-16 DIAGNOSIS — Z11.3 SCREEN FOR STD (SEXUALLY TRANSMITTED DISEASE): ICD-10-CM

## 2022-06-16 DIAGNOSIS — Z11.3 SCREEN FOR STD (SEXUALLY TRANSMITTED DISEASE): Primary | ICD-10-CM

## 2022-06-16 LAB
CANDIDA SPECIES, DNA PROBE: NEGATIVE
GARDNERELLA VAGINALIS, DNA PROBE: POSITIVE
HEPATITIS C ANTIBODY: NONREACTIVE
HIV AG/AB: NONREACTIVE
SOURCE: ABNORMAL
T. PALLIDUM, IGG: NONREACTIVE
TRICHOMONAS VAGINALIS DNA: NEGATIVE

## 2022-06-16 PROCEDURE — G8417 CALC BMI ABV UP PARAM F/U: HCPCS | Performed by: ADVANCED PRACTICE MIDWIFE

## 2022-06-16 PROCEDURE — 99213 OFFICE O/P EST LOW 20 MIN: CPT | Performed by: ADVANCED PRACTICE MIDWIFE

## 2022-06-16 PROCEDURE — G8427 DOCREV CUR MEDS BY ELIG CLIN: HCPCS | Performed by: ADVANCED PRACTICE MIDWIFE

## 2022-06-16 PROCEDURE — 1036F TOBACCO NON-USER: CPT | Performed by: ADVANCED PRACTICE MIDWIFE

## 2022-06-16 RX ORDER — TOPIRAMATE 50 MG/1
TABLET, FILM COATED ORAL
COMMUNITY
Start: 2022-06-04

## 2022-06-16 ASSESSMENT — ENCOUNTER SYMPTOMS
RESPIRATORY NEGATIVE: 1
GASTROINTESTINAL NEGATIVE: 1

## 2022-06-16 NOTE — PROGRESS NOTES
PROBLEM VISIT     Date of service: 2022    Adiel Nowak  Is a 55 y.o.  female    PT's PCP is: Donna Chambers, JONEL-CNP, JONEL - CNP     : 1975                                          Review of Systems   Constitutional: Negative. HENT: Negative. Respiratory: Negative. Gastrointestinal: Negative. Genitourinary: Negative. Neurological: Negative. Psychiatric/Behavioral: Negative. Patient's last menstrual period was 2020. OB History    Para Term  AB Living   3 3 3 0 0 3   SAB IAB Ectopic Molar Multiple Live Births                    # Outcome Date GA Lbr Jimbo/2nd Weight Sex Delivery Anes PTL Lv   3 Term            2 Term            1 Term                 Social History     Tobacco Use   Smoking Status Former Smoker    Packs/day: 1.00    Years: 10.00    Pack years: 10.00    Quit date: 2022    Years since quittin.4   Smokeless Tobacco Never Used        Social History     Substance and Sexual Activity   Alcohol Use Yes    Comment: rarely       Allergies: Tremfya [guselkumab], Dextromethorphan-guaifenesin, Aleve [naproxen], Bee venom, Ciprofloxacin, Darvocet a500 [propoxyphene n-acetaminophen], Keflex [cephalexin], Pcn [penicillins], Zithromax [azithromycin], Bactrim [sulfamethoxazole-trimethoprim], and Oxycodone      Current Outpatient Medications:     topiramate (TOPAMAX) 50 MG tablet, TAKE 1 TABLET BY MOUTH TWICE DAILY, Disp: , Rfl:     estrogens, conjugated,-methylTESTOSTERone (ESTRATEST) 1.25-2.5 MG per tablet, Take 1 tablet by mouth daily. , Disp: 90 tablet, Rfl: 3    progesterone (PROMETRIUM) 100 MG CAPS capsule, Take 1 capsule by mouth nightly, Disp: 90 capsule, Rfl: 3    EPINEPHrine (EPIPEN) 0.3 MG/0.3ML SOAJ injection, INJECT 0.3ML INTO THE APPROPRIATE MUSCLE AS NEEDED FOR ALLERGIC REACTION (Patient not taking: Reported on 2022), Disp: , Rfl:     buPROPion (WELLBUTRIN SR) 150 MG extended release tablet, Take one tablet by mouth once a day for the first three days then increase to one tablet by mouth twice daily . , Disp: , Rfl:     fluocinonide (LIDEX) 0.05 % cream, APPLY CREAM EXTERNALLY TWICE DAILY (Patient not taking: Reported on 5/26/2022), Disp: , Rfl:     SUMAtriptan (IMITREX) 100 MG tablet, Take 100 mg by mouth once as needed, Disp: , Rfl:     esomeprazole (NEXIUM) 40 MG delayed release capsule, Take 1 capsule by mouth once daily, Disp: , Rfl:     albuterol (PROVENTIL HFA;VENTOLIN HFA) 108 (90 BASE) MCG/ACT inhaler, Inhale 2 puffs into the lungs 4 times daily as needed. , Disp: , Rfl:     cetirizine (ZYRTEC) 10 MG tablet, Take 10 mg by mouth daily. , Disp: , Rfl:     Social History     Substance and Sexual Activity   Sexual Activity Not Currently       Chief Complaint   Patient presents with    Other     Pt would like to be tested for STD's. Pt  was unfaithful. Physical Exam  Constitutional:       Appearance: Normal appearance. Genitourinary:      Vulva exam comments: Normal exam - blind swabs obtained since asymptomatic for infection. HENT:      Head: Normocephalic. Eyes:      Pupils: Pupils are equal, round, and reactive to light. Pulmonary:      Effort: Pulmonary effort is normal.   Musculoskeletal:         General: Normal range of motion. Cervical back: Normal range of motion. Neurological:      Mental Status: She is alert and oriented to person, place, and time. Skin:     General: Skin is warm and dry. Psychiatric:         Behavior: Behavior normal.   Vitals and nursing note reviewed. Vital Signs  Blood pressure 110/72, height 5' 6\" (1.676 m), weight 246 lb 12.8 oz (111.9 kg), last menstrual period 08/31/2020, not currently breastfeeding. HPI Here for STi screening - reports that recently discovered spouse was unfaithful (not the first time) within the last 9-10 months. Denies all s/s \"just want checked to be sure\". Desires serum and vaginal swabs. Assessment and Plan          Diagnosis Orders   1. Screen for STD (sexually transmitted disease)  C.trachomatis N.gonorrhoeae DNA    T. pallidum Ab    Vaginitis DNA Probe    Hepatitis C Antibody    HIV Screen     Aware that we will call if abnormal results. I am having Shahzad Owens maintain her albuterol sulfate HFA, cetirizine, esomeprazole, fluocinonide, SUMAtriptan, buPROPion, EPINEPHrine, estrogens (conjugated)-methylTESTOSTERone, progesterone, and topiramate. Return in about 11 months (around 5/16/2023), or Annual - usually CD[facility] patient, for Yearly. She was also counseled on her preventative health maintenance recommendations and follow-up. There are no Patient Instructions on file for this visit.     JONEL Velásquez CNM,6/16/2022 9:05 AM                   Electronically signed by JONEL Velásquez CNM

## 2022-06-17 LAB
C TRACH DNA GENITAL QL NAA+PROBE: NEGATIVE
N. GONORRHOEAE DNA: NEGATIVE
SPECIMEN DESCRIPTION: NORMAL

## 2022-07-21 ENCOUNTER — TELEPHONE (OUTPATIENT)
Dept: OPTOMETRY | Age: 47
End: 2022-07-21

## 2022-07-21 NOTE — TELEPHONE ENCOUNTER
Patient called stating that her contacts are still blurry after the last change. Stated she gave them longer to let her eyes adjust, but she is seeing double and triple out of them. Patient was last seen on 4/25/2022 with last phone note on 5/04/2022.

## 2022-07-27 NOTE — TELEPHONE ENCOUNTER
Patient scheduled for 8/03/2022 at 1:00 pm. Patient stated its too dangerous to wear contact into appointment, due to not being able to see to drive. Patient will bring contacts with her.

## 2022-08-11 ENCOUNTER — TELEPHONE (OUTPATIENT)
Dept: OPTOMETRY | Age: 47
End: 2022-08-11

## 2022-08-17 ENCOUNTER — OFFICE VISIT (OUTPATIENT)
Dept: OPTOMETRY | Age: 47
End: 2022-08-17
Payer: COMMERCIAL

## 2022-08-17 DIAGNOSIS — H52.03 HYPEROPIA WITH PRESBYOPIA OF BOTH EYES: Primary | ICD-10-CM

## 2022-08-17 DIAGNOSIS — H52.4 HYPEROPIA WITH PRESBYOPIA OF BOTH EYES: Primary | ICD-10-CM

## 2022-08-17 PROCEDURE — 99999 PR OFFICE/OUTPT VISIT,PROCEDURE ONLY: CPT | Performed by: OPTOMETRIST

## 2022-08-17 PROCEDURE — 99211 OFF/OP EST MAY X REQ PHY/QHP: CPT | Performed by: OPTOMETRIST

## 2022-08-17 ASSESSMENT — ENCOUNTER SYMPTOMS
EYES NEGATIVE: 0
RESPIRATORY NEGATIVE: 0
GASTROINTESTINAL NEGATIVE: 0
ALLERGIC/IMMUNOLOGIC NEGATIVE: 0

## 2022-08-17 ASSESSMENT — VISUAL ACUITY
OS_SC: 20/40
CORRECTION_TYPE: CONTACTS
OD_PH_SC: 20/30 OU
OD_SC: 20/60
METHOD: SNELLEN - LINEAR
OD_PH_SC+: -1
OD_SC+: -1
OS_SC+: -1
OD_SC: 20/40 OU

## 2022-08-17 ASSESSMENT — REFRACTION_CURRENTRX
OS_BRAND: B AND L ULTRA
OD_ADDL_SPECS: LOW
OS_SPHERE: +0.25
OS_ADDL_SPECS: LOW
OD_BRAND: B AND L ULTRA
OD_SPHERE: +0.25

## 2022-08-17 ASSESSMENT — REFRACTION_MANIFEST
OS_SPHERE: +0.75
OD_CYLINDER: DS
OD_SPHERE: +0.75
OS_CYLINDER: DS

## 2022-08-17 NOTE — PROGRESS NOTES
Mayur Devika presents today for   Chief Complaint   Patient presents with    Other     Patient is here for a Contact lens check. Patient reports she not able to drive with contacts in and is not able to where them as directed. Sherryle Moder HPI       Other              Comments: Patient is here for a Contact lens check. Patient reports she not able to drive with contacts in and is not able to where them as directed. Visual Acuity (Snellen - Linear)         Right Left    Dist sc 20/60 -1 20/40 -1    Dist ph sc 20/30 OU -1     Near sc 20/40 OU       Correction: Contacts            Contact Lens Current Rx       Current Contact Lens Rx         Brand Base Curve Diameter Sphere Cylinder Addl. Specs    Right b and l ultra    +0.25  low     Left b and l ultra    +0.25  low               Current Contact Lens Rx #2 (Trial Lens)         Brand Base Curve Diameter Sphere Cylinder Addl. Specs    Right air optix multifocal  8.6 14.2 +0.75  low     Left airoptix multifocal   8.6 14.2 +0.75  low               Current Contact Lens Rx #3         Brand Base Curve Diameter Sphere Cylinder Addl. Specs    Right b and l ultra    +0.50 ds     Left b and l ultra    +1.50 ds               Current Contact Lens Rx Comments    #2 in the office after spherical refraction of +.75 ou for distance                Patient left with monovision with the distance in the right eye and near in the left eye rx #3. Visual acuity was excellent in the near and 20/30-- in the distance  When showing - over the left eye she states the vision in the distance is worse? ? Left with +.50 trial also for the right eye (extra)   FINAL RX         1. Hyperopia with presbyopia of both eyes         There are no Patient Instructions on file for this visit. Return in about 2 weeks (around 8/31/2022) for contact lens follow-up.

## 2022-10-17 ENCOUNTER — NURSE ONLY (OUTPATIENT)
Dept: LAB | Age: 47
End: 2022-10-17

## 2022-10-23 LAB
QUANTI TB GOLD PLUS: NEGATIVE
QUANTI TB1 MINUS NIL: 0 IU/ML (ref 0–0.34)
QUANTI TB2 MINUS NIL: 0 IU/ML (ref 0–0.34)
QUANTIFERON MITOGEN MINUS NIL: > 10 IU/ML
QUANTIFERON NIL: 0.04 IU/ML

## 2022-10-31 ENCOUNTER — TELEPHONE (OUTPATIENT)
Dept: OBGYN CLINIC | Age: 47
End: 2022-10-31

## 2022-10-31 DIAGNOSIS — Z20.2 POSSIBLE EXPOSURE TO STD: Primary | ICD-10-CM

## 2022-10-31 NOTE — TELEPHONE ENCOUNTER
Pt calling stating her spouse recently had bloodwork and was shown to be positive for herpes. Pt reports she has never had a lesions but requests to have bloodowrk done as well. OK to order?

## 2022-12-07 ENCOUNTER — NURSE ONLY (OUTPATIENT)
Dept: LAB | Age: 47
End: 2022-12-07

## 2022-12-07 DIAGNOSIS — Z20.2 POSSIBLE EXPOSURE TO STD: ICD-10-CM

## 2022-12-09 LAB
HERPES TYPE 1/2 IGM COMBINED: 1.27 IV
HERPES TYPE I/II IGG COMBINED: > 22.4 IV

## 2022-12-13 DIAGNOSIS — N95.1 MENOPAUSAL SYMPTOMS: ICD-10-CM

## 2022-12-15 DIAGNOSIS — N95.1 MENOPAUSAL SYMPTOMS: ICD-10-CM

## 2022-12-16 RX ORDER — ESTERIFIED ESTROGEN AND METHYLTESTOSTERONE 1.25; 2.5 MG/1; MG/1
TABLET ORAL
Qty: 30 TABLET | Refills: 0 | OUTPATIENT
Start: 2022-12-16

## 2022-12-16 RX ORDER — ESTERIFIED ESTROGEN AND METHYLTESTOSTERONE 1.25; 2.5 MG/1; MG/1
1 TABLET ORAL DAILY
Qty: 90 TABLET | Refills: 1 | OUTPATIENT
Start: 2022-12-16 | End: 2023-12-16

## 2022-12-16 RX ORDER — ESTERIFIED ESTROGEN AND METHYLTESTOSTERONE 1.25; 2.5 MG/1; MG/1
1 TABLET ORAL DAILY
Qty: 90 TABLET | Refills: 1 | Status: SHIPPED | OUTPATIENT
Start: 2022-12-16 | End: 2023-12-16

## 2022-12-24 ENCOUNTER — APPOINTMENT (OUTPATIENT)
Dept: GENERAL RADIOLOGY | Age: 47
End: 2022-12-24
Payer: COMMERCIAL

## 2022-12-24 ENCOUNTER — HOSPITAL ENCOUNTER (EMERGENCY)
Age: 47
Discharge: HOME | End: 2022-12-24
Attending: EMERGENCY MEDICINE
Payer: COMMERCIAL

## 2022-12-24 VITALS
SYSTOLIC BLOOD PRESSURE: 127 MMHG | DIASTOLIC BLOOD PRESSURE: 79 MMHG | OXYGEN SATURATION: 98 % | WEIGHT: 245.4 LBS | HEIGHT: 66 IN | BODY MASS INDEX: 39.44 KG/M2 | RESPIRATION RATE: 10 BRPM | TEMPERATURE: 97.7 F | HEART RATE: 93 BPM

## 2022-12-24 DIAGNOSIS — R07.9 CHEST PAIN, UNSPECIFIED TYPE: Primary | ICD-10-CM

## 2022-12-24 LAB
ABSOLUTE EOS #: 0.15 K/UL (ref 0–0.44)
ABSOLUTE IMMATURE GRANULOCYTE: 0.08 K/UL (ref 0–0.3)
ABSOLUTE LYMPH #: 2.99 K/UL (ref 1.1–3.7)
ABSOLUTE MONO #: 0.8 K/UL (ref 0.1–1.2)
ALBUMIN SERPL-MCNC: 4.2 G/DL (ref 3.5–5.2)
ALBUMIN/GLOBULIN RATIO: 1.3 (ref 1–2.5)
ALP BLD-CCNC: 105 U/L (ref 35–104)
ALT SERPL-CCNC: 27 U/L (ref 5–33)
ANION GAP SERPL CALCULATED.3IONS-SCNC: 11 MMOL/L (ref 9–17)
AST SERPL-CCNC: 42 U/L
BASOPHILS # BLD: 1 % (ref 0–2)
BASOPHILS ABSOLUTE: 0.06 K/UL (ref 0–0.2)
BILIRUB SERPL-MCNC: 0.5 MG/DL (ref 0.3–1.2)
BUN BLDV-MCNC: 12 MG/DL (ref 6–20)
BUN/CREAT BLD: 11 (ref 9–20)
CALCIUM SERPL-MCNC: 9.1 MG/DL (ref 8.6–10.4)
CHLORIDE BLD-SCNC: 103 MMOL/L (ref 98–107)
CO2: 23 MMOL/L (ref 20–31)
CREAT SERPL-MCNC: 1.06 MG/DL (ref 0.5–0.9)
D-DIMER QUANTITATIVE: 0.37 MG/L FEU (ref 0–0.59)
EKG ATRIAL RATE: 79 BPM
EKG ATRIAL RATE: 91 BPM
EKG P AXIS: 43 DEGREES
EKG P AXIS: 51 DEGREES
EKG P-R INTERVAL: 158 MS
EKG P-R INTERVAL: 176 MS
EKG Q-T INTERVAL: 388 MS
EKG Q-T INTERVAL: 400 MS
EKG QRS DURATION: 88 MS
EKG QRS DURATION: 88 MS
EKG QTC CALCULATION (BAZETT): 458 MS
EKG QTC CALCULATION (BAZETT): 477 MS
EKG R AXIS: 14 DEGREES
EKG T AXIS: 11 DEGREES
EKG T AXIS: 19 DEGREES
EKG VENTRICULAR RATE: 79 BPM
EKG VENTRICULAR RATE: 91 BPM
EOSINOPHILS RELATIVE PERCENT: 2 % (ref 1–4)
GFR SERPL CREATININE-BSD FRML MDRD: >60 ML/MIN/1.73M2
GLUCOSE BLD-MCNC: 102 MG/DL (ref 70–99)
HCT VFR BLD CALC: 43.2 % (ref 36.3–47.1)
HEMOGLOBIN: 14.8 G/DL (ref 11.9–15.1)
IMMATURE GRANULOCYTES: 1 %
LIPASE: 43 U/L (ref 13–60)
LYMPHOCYTES # BLD: 34 % (ref 24–43)
MCH RBC QN AUTO: 31.4 PG (ref 25.2–33.5)
MCHC RBC AUTO-ENTMCNC: 34.3 G/DL (ref 25.2–33.5)
MCV RBC AUTO: 91.5 FL (ref 82.6–102.9)
MONOCYTES # BLD: 9 % (ref 3–12)
NRBC AUTOMATED: 0 PER 100 WBC
PDW BLD-RTO: 13.3 % (ref 11.8–14.4)
PLATELET # BLD: 268 K/UL (ref 138–453)
PMV BLD AUTO: 10.6 FL (ref 8.1–13.5)
POTASSIUM SERPL-SCNC: 3.9 MMOL/L (ref 3.7–5.3)
RBC # BLD: 4.72 M/UL (ref 3.95–5.11)
SEG NEUTROPHILS: 53 % (ref 36–65)
SEGMENTED NEUTROPHILS ABSOLUTE COUNT: 4.71 K/UL (ref 1.5–8.1)
SODIUM BLD-SCNC: 137 MMOL/L (ref 135–144)
TOTAL PROTEIN: 7.5 G/DL (ref 6.4–8.3)
TROPONIN, HIGH SENSITIVITY: <6 NG/L (ref 0–14)
TROPONIN, HIGH SENSITIVITY: <6 NG/L (ref 0–14)
WBC # BLD: 8.8 K/UL (ref 3.5–11.3)

## 2022-12-24 PROCEDURE — 36415 COLL VENOUS BLD VENIPUNCTURE: CPT

## 2022-12-24 PROCEDURE — 99285 EMERGENCY DEPT VISIT HI MDM: CPT

## 2022-12-24 PROCEDURE — 80053 COMPREHEN METABOLIC PANEL: CPT

## 2022-12-24 PROCEDURE — 85379 FIBRIN DEGRADATION QUANT: CPT

## 2022-12-24 PROCEDURE — 83690 ASSAY OF LIPASE: CPT

## 2022-12-24 PROCEDURE — 71046 X-RAY EXAM CHEST 2 VIEWS: CPT

## 2022-12-24 PROCEDURE — 85025 COMPLETE CBC W/AUTO DIFF WBC: CPT

## 2022-12-24 PROCEDURE — 84484 ASSAY OF TROPONIN QUANT: CPT

## 2022-12-24 PROCEDURE — 93005 ELECTROCARDIOGRAM TRACING: CPT | Performed by: EMERGENCY MEDICINE

## 2022-12-24 ASSESSMENT — ENCOUNTER SYMPTOMS
COUGH: 0
ABDOMINAL PAIN: 1
VOMITING: 0
SHORTNESS OF BREATH: 0
DIARRHEA: 0
EYE PAIN: 0
CONSTIPATION: 0
BACK PAIN: 0
BLOOD IN STOOL: 0
NAUSEA: 0

## 2022-12-24 ASSESSMENT — PAIN - FUNCTIONAL ASSESSMENT
PAIN_FUNCTIONAL_ASSESSMENT: NONE - DENIES PAIN
PAIN_FUNCTIONAL_ASSESSMENT: PREVENTS OR INTERFERES SOME ACTIVE ACTIVITIES AND ADLS
PAIN_FUNCTIONAL_ASSESSMENT: 0-10

## 2022-12-24 ASSESSMENT — PAIN DESCRIPTION - DESCRIPTORS
DESCRIPTORS: SHARP;STABBING
DESCRIPTORS: ACHING

## 2022-12-24 ASSESSMENT — PAIN DESCRIPTION - PAIN TYPE: TYPE: ACUTE PAIN

## 2022-12-24 ASSESSMENT — PAIN SCALES - GENERAL
PAINLEVEL_OUTOF10: 1
PAINLEVEL_OUTOF10: 6

## 2022-12-24 ASSESSMENT — PAIN DESCRIPTION - LOCATION
LOCATION: STERNUM
LOCATION: ABDOMEN

## 2022-12-24 ASSESSMENT — PAIN DESCRIPTION - ONSET: ONSET: PROGRESSIVE

## 2022-12-24 ASSESSMENT — PAIN DESCRIPTION - FREQUENCY: FREQUENCY: CONTINUOUS

## 2022-12-24 ASSESSMENT — PAIN DESCRIPTION - ORIENTATION: ORIENTATION: RIGHT;UPPER

## 2022-12-24 NOTE — ED PROVIDER NOTES
[facility]  eMERGENCY dEPARTMENT eNCOUnter      Pt Name: Denis Leon  MRN: [id number]  [place] 1975  Date of evaluation: 12/24/2022      CHIEF COMPLAINT       Chief Complaint   Patient presents with    Chest Pain     Chest pain started approx 20 min PTA. Sternal chest pain and right rib pain. HISTORY OF PRESENT ILLNESS    Denis Leon is a 52 y.o. female who presents complaining chest pain started 20 minutes ago she was cooking doing some gamesGRABR cookies she ate a boiled egg and she said soon after she developed midepigastric and right-sided chest pain and went around a little bit to her back she got seem to ease up but is returned pain no shortness of breath no fevers or chills or cough its all, on the right lateral but I did come and go substernal in the front  She denies any history of blood clots is been no recent travel or immobilization no heart history she is neck smoker having quit a year ago    REVIEW OF SYSTEMS         Review of Systems   Constitutional:  Negative for chills and fever. HENT:  Negative for congestion and ear pain. Eyes:  Negative for pain and visual disturbance. Respiratory:  Negative for cough and shortness of breath. Cardiovascular:  Positive for chest pain. Negative for palpitations and leg swelling. Gastrointestinal:  Positive for abdominal pain. Negative for blood in stool, constipation, diarrhea, nausea and vomiting. Endocrine: Negative for polydipsia and polyuria. Genitourinary:  Negative for difficulty urinating, dysuria and frequency. Musculoskeletal:  Negative for back pain, joint swelling, myalgias, neck pain and neck stiffness. Skin:  Negative for rash. Neurological:  Negative for dizziness, weakness and headaches. Hematological:  Negative for adenopathy. Does not bruise/bleed easily. Psychiatric/Behavioral:  Negative for confusion, self-injury and suicidal ideas.         PAST MEDICAL HISTORY    has a past medical history of Active smoker, Anxiety, Asthma, Asthma, Balance problem, Bronchitis, Cervical radicular pain, CTS (carpal tunnel syndrome), Depressive disorder, Fall at home, Headache, Migraine, Multiple allergies, Neck pain, Peripheral neuropathy, Pneumonia, and Psoriasis. SURGICAL HISTORY      has a past surgical history that includes Tonsillectomy; Tubal ligation; Cervix lesion destruction; Tympanostomy tube placement; Endometrial ablation; Carpal tunnel release (Bilateral); Hysterectomy, vaginal (N/A, 01/11/2021); and Ovary removal (Bilateral, 01/11/2021). CURRENT MEDICATIONS       Discharge Medication List as of 12/24/2022  4:52 PM        CONTINUE these medications which have NOT CHANGED    Details   estrogens, conjugated,-methylTESTOSTERone (ESTRATEST) 1.25-2.5 MG per tablet Take 1 tablet by mouth daily. , Disp-90 tablet, R-1Normal      topiramate (TOPAMAX) 50 MG tablet TAKE 1 TABLET BY MOUTH TWICE DAILYHistorical Med      progesterone (PROMETRIUM) 100 MG CAPS capsule Take 1 capsule by mouth nightly, Disp-90 capsule, R-3Normal      EPINEPHrine (EPIPEN) 0.3 MG/0.3ML SOAJ injection INJECT 0.3ML INTO THE APPROPRIATE MUSCLE AS NEEDED FOR ALLERGIC REACTIONHistorical Med      buPROPion (WELLBUTRIN SR) 150 MG extended release tablet Take one tablet by mouth once a day for the first three days then increase to one tablet by mouth twice daily . Historical Med      fluocinonide (LIDEX) 0.05 % cream APPLY CREAM EXTERNALLY TWICE DAILY, Historical Med      SUMAtriptan (IMITREX) 100 MG tablet Take 100 mg by mouth once as neededHistorical Med      esomeprazole (NEXIUM) 40 MG delayed release capsule Take 1 capsule by mouth once dailyHistorical Med      albuterol (PROVENTIL HFA;VENTOLIN HFA) 108 (90 BASE) MCG/ACT inhaler Inhale 2 puffs into the lungs 4 times daily as needed. Historical Med      cetirizine (ZYRTEC) 10 MG tablet Take 10 mg by mouth daily. Historical Med             ALLERGIES     is allergic to tremfya [guselkumab], dextromethorphan-guaifenesin, aleve [naproxen], bee venom, ciprofloxacin, darvocet a500 [propoxyphene n-acetaminophen], keflex [cephalexin], pcn [penicillins], zithromax [azithromycin], bactrim [sulfamethoxazole-trimethoprim], and oxycodone. FAMILY HISTORY     She indicated that the status of her mother is unknown. She indicated that the status of her father is unknown. She indicated that both of her paternal aunts are . family history includes Alzheimer's Disease in an other family member; Asthma in her father; Breast Cancer in her paternal aunt and paternal aunt; Cancer in her father, mother, and another family member; Juan Sherwin in her paternal aunt; Elevated Lipids in her father, mother, and another family member; Heart Disease in her father and another family member; Hypertension in her father, mother, and another family member; Cathye Mauri in her paternal aunt; Lupus in her mother; Mult Sclerosis in an other family member; Other in her mother; Seizures in her father; Stroke in her mother and another family member; Thyroid Disease in an other family member. SOCIAL HISTORY      reports that she quit smoking about a year ago. She has a 10.00 pack-year smoking history. She has never used smokeless tobacco. She reports current alcohol use. She reports that she does not use drugs. PHYSICAL EXAM     INITIAL VITALS:  height is 5' 6\" (1.676 m) and weight is 245 lb 6.4 oz (111.3 kg). Her tympanic temperature is 97.7 °F (36.5 °C). Her blood pressure is 127/79 and her pulse is 93. Her respiration is 10 and oxygen saturation is 98%. Physical Exam  Constitutional:       Appearance: Normal appearance. She is well-developed. She is obese. HENT:      Head: Normocephalic and atraumatic. Right Ear: External ear normal.      Left Ear: External ear normal.   Eyes:      Conjunctiva/sclera: Conjunctivae normal.      Pupils: Pupils are equal, round, and reactive to light.    Cardiovascular: Rate and Rhythm: Normal rate and regular rhythm. Pulmonary:      Effort: Pulmonary effort is normal.      Breath sounds: Normal breath sounds. Abdominal:      General: Bowel sounds are normal. There is no distension. Palpations: Abdomen is soft. There is no mass. Tenderness: There is no abdominal tenderness. There is no guarding or rebound. Hernia: No hernia is present. Musculoskeletal:         General: No tenderness. Normal range of motion. Cervical back: Normal range of motion. Right lower leg: No edema. Left lower leg: No edema. Skin:     General: Skin is warm and dry. Capillary Refill: Capillary refill takes less than 2 seconds. Neurological:      General: No focal deficit present. Mental Status: She is alert and oriented to person, place, and time. Psychiatric:         Behavior: Behavior normal.         DIFFERENTIAL DIAGNOSIS/ MDM:     Atypical chest pain could be gallbladder related could be GI we will do a work-up    DIAGNOSTIC RESULTS     EKG: All EKG's are interpreted by the Emergency Department Physician who either signs or Co-signs this chart in the absence of a cardiologist.  Normal sinus rhythm rate of 91 bpm RI was 158 ms QRS durations 88 ms QT corrected 477 ms axis is 14 there is no acute ST or T wave changes seen    EKG shows normal sinus rhythm at 79 bpm parables 176 ms QRS durations 88 ms QT corrected 4 and 58 ms axis is -1 there is no acute ST or T wave changes seen no significant change from prior  RADIOLOGY:   I directly visualized the following  images and reviewed the radiologist interpretations:       EXAMINATION:   TWO XRAY VIEWS OF THE CHEST       12/24/2022 2:27 pm       COMPARISON:   02/15/2022       HISTORY:   ORDERING SYSTEM PROVIDED HISTORY: Chest pain   TECHNOLOGIST PROVIDED HISTORY:   Chest pain   Reason for Exam: Chest and back pain       26-year-old female with chest and back pain.        FINDINGS:   Cardiac monitor leads overlie the chest.       Trachea is midline. Cardiac and mediastinal contours are within normal limits. No pneumothorax   is seen. No free air is seen below the diaphragm. No acute focal airspace consolidation or pleural effusions are identified. Mild degenerative changes throughout the spine. Impression   No acute focal airspace consolidation. ED BEDSIDE ULTRASOUND:       LABS:  Labs Reviewed   CBC WITH AUTO DIFFERENTIAL - Abnormal; Notable for the following components:       Result Value    MCHC 34.3 (*)     Immature Granulocytes 1 (*)     All other components within normal limits   COMPREHENSIVE METABOLIC PANEL W/ REFLEX TO MG FOR LOW K - Abnormal; Notable for the following components:    Glucose 102 (*)     Creatinine 1.06 (*)     Alkaline Phosphatase 105 (*)     AST 42 (*)     All other components within normal limits   LIPASE   TROPONIN   D-DIMER, QUANTITATIVE   TROPONIN           EMERGENCY DEPARTMENT COURSE:   Vitals:    Vitals:    12/24/22 1530 12/24/22 1545 12/24/22 1548 12/24/22 1600   BP:    127/79   Pulse: 85 94 86 93   Resp: 16 16 14 10   Temp:       TempSrc:       SpO2: 97% 99% 98% 98%   Weight:       Height:         -------------------------  BP: 127/79, Temp: 97.7 °F (36.5 °C), Heart Rate: 93, Resp: 10        Re-evaluation Notes    Resting comfortably in no pain    CRITICAL CARE:   None        CONSULTS:      PROCEDURES:  None    FINAL IMPRESSION      1.  Chest pain, unspecified type          DISPOSITION/PLAN   DISPOSITION Decision To Discharge    Condition on Disposition    Stable    PATIENT REFERRED TO:  JONEL Narayanan  CNP  [place] [phone] [phone]          DISCHARGE MEDICATIONS:  Discharge Medication List as of 12/24/2022  4:52 PM          (Please note that portions of this note were completed with a voice recognition program.  Efforts were made to edit the dictations but occasionally words are mis-transcribed.)    Leanna Srinivasan Elene Soulier, MD,, MD, F.A.A.E.M.   Attending Emergency Physician                           Tobias Bocanegra MD  12/24/22 [id number]

## 2022-12-26 LAB
EKG ATRIAL RATE: 79 BPM
EKG ATRIAL RATE: 91 BPM
EKG P AXIS: 43 DEGREES
EKG P AXIS: 51 DEGREES
EKG P-R INTERVAL: 158 MS
EKG P-R INTERVAL: 176 MS
EKG Q-T INTERVAL: 388 MS
EKG Q-T INTERVAL: 400 MS
EKG QRS DURATION: 88 MS
EKG QRS DURATION: 88 MS
EKG QTC CALCULATION (BAZETT): 458 MS
EKG QTC CALCULATION (BAZETT): 477 MS
EKG R AXIS: 14 DEGREES
EKG T AXIS: 11 DEGREES
EKG T AXIS: 19 DEGREES
EKG VENTRICULAR RATE: 79 BPM
EKG VENTRICULAR RATE: 91 BPM

## 2023-04-04 ENCOUNTER — OFFICE VISIT (OUTPATIENT)
Dept: OBGYN CLINIC | Age: 48
End: 2023-04-04
Payer: COMMERCIAL

## 2023-04-04 VITALS
BODY MASS INDEX: 40.08 KG/M2 | DIASTOLIC BLOOD PRESSURE: 70 MMHG | WEIGHT: 249.4 LBS | HEIGHT: 66 IN | SYSTOLIC BLOOD PRESSURE: 110 MMHG

## 2023-04-04 DIAGNOSIS — R63.5 WEIGHT GAIN: ICD-10-CM

## 2023-04-04 DIAGNOSIS — R41.3 MEMORY CHANGE: ICD-10-CM

## 2023-04-04 DIAGNOSIS — G47.9 SLEEP DISTURBANCE: ICD-10-CM

## 2023-04-04 DIAGNOSIS — N95.1 MENOPAUSAL SYMPTOMS: Primary | ICD-10-CM

## 2023-04-04 DIAGNOSIS — Z63.79 STRESSFUL LIFE EVENTS AFFECTING FAMILY AND HOUSEHOLD: ICD-10-CM

## 2023-04-04 DIAGNOSIS — R45.86 MOOD CHANGE: ICD-10-CM

## 2023-04-04 PROCEDURE — G8417 CALC BMI ABV UP PARAM F/U: HCPCS | Performed by: ADVANCED PRACTICE MIDWIFE

## 2023-04-04 PROCEDURE — G8427 DOCREV CUR MEDS BY ELIG CLIN: HCPCS | Performed by: ADVANCED PRACTICE MIDWIFE

## 2023-04-04 PROCEDURE — 99214 OFFICE O/P EST MOD 30 MIN: CPT | Performed by: ADVANCED PRACTICE MIDWIFE

## 2023-04-04 PROCEDURE — 1036F TOBACCO NON-USER: CPT | Performed by: ADVANCED PRACTICE MIDWIFE

## 2023-04-05 ENCOUNTER — TELEPHONE (OUTPATIENT)
Dept: OBGYN | Age: 48
End: 2023-04-05

## 2023-04-05 ASSESSMENT — ENCOUNTER SYMPTOMS
RESPIRATORY NEGATIVE: 1
GASTROINTESTINAL NEGATIVE: 1

## 2023-04-05 NOTE — PROGRESS NOTES
Chaperone for Intimate Exam  Chaperone was {offered & accepted, requested or declined:[id number]::\"offered and accepted as part of the rooming process. \"}  Chaperone: ***
over weight - has cut out soda, drinking more water, low fat diet for 6 weeks and \"nothing has changed\". Sometimes only eats once a day. \"Don't lose weight even if do not eat\". No recent wellness labs that she is aware of. Valentin - has done it between death of parents and \"did help but not really sure want to do it again\". Admits \"feel happy though just have times\". Also reports issues with attention and memory \"you can tell me something and I will not remember it 2 minutes later\" - makes a lot of lists - onset 2022    Review of Systems   Constitutional:  Positive for fatigue. Difficulty losing weight   HENT: Negative. Respiratory: Negative. Gastrointestinal: Negative. Endocrine:        Vasomotor s/s     Genitourinary: Negative. Musculoskeletal: Negative. Neurological:         Memory concerns   Psychiatric/Behavioral:  Positive for agitation, dysphoric mood and sleep disturbance. Negative for self-injury and suicidal ideas. The patient is nervous/anxious. Patient's last menstrual period was 2020.    OB History    Para Term  AB Living   3 3 3 0 0 3   SAB IAB Ectopic Molar Multiple Live Births                    # Outcome Date GA Lbr Jimbo/2nd Weight Sex Delivery Anes PTL Lv   3 Term            2 Term            1 Term                 Social History     Tobacco Use   Smoking Status Former    Packs/day: 1.00    Years: 10.00    Pack years: 10.00    Types: Cigarettes    Quit date: 2022    Years since quittin.2   Smokeless Tobacco Never        Social History     Substance and Sexual Activity   Alcohol Use Yes    Comment: rarely       Allergies: Tremfya [guselkumab], Dextromethorphan-guaifenesin, Aleve [naproxen], Bee venom, Ciprofloxacin, Darvocet a500 [propoxyphene n-acetaminophen], Keflex [cephalexin], Pcn [penicillins], Zithromax [azithromycin], Bactrim [sulfamethoxazole-trimethoprim], and Oxycodone      Current Outpatient Medications:

## 2023-06-17 DIAGNOSIS — N95.1 MENOPAUSAL SYMPTOMS: ICD-10-CM

## 2023-06-19 DIAGNOSIS — N95.1 MENOPAUSAL SYMPTOMS: ICD-10-CM

## 2023-06-19 RX ORDER — PROGESTERONE 100 MG/1
100 CAPSULE ORAL NIGHTLY
Qty: 30 CAPSULE | Refills: 0 | OUTPATIENT
Start: 2023-06-19

## 2023-06-19 RX ORDER — ESTERIFIED ESTROGEN AND METHYLTESTOSTERONE 1.25; 2.5 MG/1; MG/1
TABLET ORAL
Qty: 30 TABLET | Refills: 0 | OUTPATIENT
Start: 2023-06-19

## 2023-06-20 DIAGNOSIS — N95.1 MENOPAUSAL SYMPTOMS: ICD-10-CM

## 2023-06-20 RX ORDER — PROGESTERONE 100 MG/1
100 CAPSULE ORAL NIGHTLY
Qty: 90 CAPSULE | Refills: 0 | Status: SHIPPED | OUTPATIENT
Start: 2023-06-20

## 2023-06-20 RX ORDER — PROGESTERONE 100 MG/1
100 CAPSULE ORAL NIGHTLY
Qty: 27 CAPSULE | Refills: 0 | OUTPATIENT
Start: 2023-06-20

## 2023-06-20 RX ORDER — ESTERIFIED ESTROGEN AND METHYLTESTOSTERONE 1.25; 2.5 MG/1; MG/1
TABLET ORAL
Qty: 30 TABLET | Refills: 0 | OUTPATIENT
Start: 2023-06-20

## 2023-06-20 RX ORDER — PROGESTERONE 100 MG/1
100 CAPSULE ORAL NIGHTLY
Qty: 90 CAPSULE | Refills: 3 | OUTPATIENT
Start: 2023-06-20

## 2023-06-20 RX ORDER — ESTERIFIED ESTROGEN AND METHYLTESTOSTERONE 1.25; 2.5 MG/1; MG/1
1 TABLET ORAL DAILY
Qty: 90 TABLET | Refills: 0 | Status: SHIPPED | OUTPATIENT
Start: 2023-06-20 | End: 2024-06-19

## 2023-06-20 RX ORDER — ESTERIFIED ESTROGEN AND METHYLTESTOSTERONE 1.25; 2.5 MG/1; MG/1
1 TABLET ORAL DAILY
Qty: 90 TABLET | Refills: 1 | OUTPATIENT
Start: 2023-06-20 | End: 2024-06-19

## 2023-06-22 RX ORDER — PROGESTERONE 100 MG/1
100 CAPSULE ORAL NIGHTLY
Qty: 90 CAPSULE | Refills: 3 | OUTPATIENT
Start: 2023-06-22

## 2023-06-22 RX ORDER — ESTERIFIED ESTROGEN AND METHYLTESTOSTERONE 1.25; 2.5 MG/1; MG/1
1 TABLET ORAL DAILY
Qty: 90 TABLET | Refills: 1 | OUTPATIENT
Start: 2023-06-22 | End: 2024-06-21

## 2023-07-11 ENCOUNTER — OFFICE VISIT (OUTPATIENT)
Dept: OBGYN CLINIC | Age: 48
End: 2023-07-11
Payer: COMMERCIAL

## 2023-07-11 VITALS
WEIGHT: 259.4 LBS | DIASTOLIC BLOOD PRESSURE: 70 MMHG | SYSTOLIC BLOOD PRESSURE: 110 MMHG | BODY MASS INDEX: 41.69 KG/M2 | HEIGHT: 66 IN

## 2023-07-11 DIAGNOSIS — Z12.72 SMEAR, VAGINAL, AS PART OF ROUTINE GYNECOLOGICAL EXAMINATION: Primary | ICD-10-CM

## 2023-07-11 DIAGNOSIS — N95.1 MENOPAUSAL SYMPTOMS: ICD-10-CM

## 2023-07-11 DIAGNOSIS — Z01.419 SMEAR, VAGINAL, AS PART OF ROUTINE GYNECOLOGICAL EXAMINATION: Primary | ICD-10-CM

## 2023-07-11 PROCEDURE — 99396 PREV VISIT EST AGE 40-64: CPT | Performed by: ADVANCED PRACTICE MIDWIFE

## 2023-07-11 RX ORDER — ESTERIFIED ESTROGEN AND METHYLTESTOSTERONE 1.25; 2.5 MG/1; MG/1
1 TABLET ORAL DAILY
Qty: 30 TABLET | Refills: 5 | Status: SHIPPED | OUTPATIENT
Start: 2023-07-11 | End: 2024-07-10

## 2023-07-11 RX ORDER — PROGESTERONE 100 MG/1
100 CAPSULE ORAL NIGHTLY
Qty: 30 CAPSULE | Refills: 12 | Status: SHIPPED | OUTPATIENT
Start: 2023-07-11

## 2023-07-11 RX ORDER — PAROXETINE HYDROCHLORIDE 20 MG/1
20 TABLET, FILM COATED ORAL EVERY MORNING
COMMUNITY
Start: 2023-07-06

## 2023-07-11 RX ORDER — SECUKINUMAB 150 MG/ML
INJECTION SUBCUTANEOUS
COMMUNITY
Start: 2023-06-15

## 2023-07-11 RX ORDER — CYCLOBENZAPRINE HCL 5 MG
5 TABLET ORAL 3 TIMES DAILY PRN
COMMUNITY
Start: 2023-06-26

## 2023-07-11 ASSESSMENT — ENCOUNTER SYMPTOMS
SHORTNESS OF BREATH: 0
DIARRHEA: 0
GASTROINTESTINAL NEGATIVE: 1
CONSTIPATION: 0
ABDOMINAL PAIN: 0
RESPIRATORY NEGATIVE: 1

## 2023-07-11 NOTE — PROGRESS NOTES
YEARLY PHYSICAL    Date of service: 2023    Karel Cabrera  Is a 52 y.o.   female    PT's PCP is: JONEL Chavarria CNP     : 1975                                             Subjective:       Patient's last menstrual period was 2020.      Are your menses regular: not applicable    OB History    Para Term  AB Living   3 3 3 0 0 3   SAB IAB Ectopic Molar Multiple Live Births             3      # Outcome Date GA Lbr Jimbo/2nd Weight Sex Delivery Anes PTL Lv   3 Term            2 Term            1 Term                 Social History     Tobacco Use   Smoking Status Former    Packs/day: 1.00    Years: 10.00    Pack years: 10.00    Types: Cigarettes    Quit date: 2022    Years since quittin.5   Smokeless Tobacco Never        Social History     Substance and Sexual Activity   Alcohol Use Not Currently    Comment: rarely       Family History   Problem Relation Age of Onset    Heart Disease Father     Hypertension Father     Elevated Lipids Father     Seizures Father     Asthma Father     Cancer Father         skin    Stroke Mother     Hypertension Mother     Elevated Lipids Mother     Cancer Mother         uterine    Lupus Mother     Other Mother         myasthenia gravis    Breast Cancer Paternal Aunt     Lung Cancer Paternal Aunt     Breast Cancer Paternal Aunt     Colon Cancer Paternal Aunt     Heart Disease Other     Stroke Other     Hypertension Other     Elevated Lipids Other     Thyroid Disease Other     Alzheimer's Disease Other     Cancer Other         lung/skin/colon    Mult Sclerosis Other        Any family history of breast or ovarian cancer: No    Any family history of blood clots: No      Allergies: Tremfya [guselkumab], Dextromethorphan-guaifenesin, Aleve [naproxen], Bee venom, Ciprofloxacin, Darvocet a500 [propoxyphene n-acetaminophen], Keflex [cephalexin], Pcn [penicillins], Zithromax

## 2023-08-22 ENCOUNTER — TELEPHONE (OUTPATIENT)
Dept: OBGYN CLINIC | Age: 48
End: 2023-08-22

## 2023-08-22 NOTE — TELEPHONE ENCOUNTER
Received a call from [place] at [facility] DR MAGO COOL. Patient is taking Estratest and her insurance only covers [facility] [id number] and there are no local pharmacies that are able to order that [facility]. Patient's insurance is needing a PA to attempt to get authorization for a different NDC. Request initiated through Cover My Meds. Patient aware that this has been started.

## 2023-08-29 NOTE — TELEPHONE ENCOUNTER
Patient called back for an update. She spoke to her insurance and they denied her appeal to get a different NCD covered. They told patient that she needs to use a different medication until this NDC becomes available. Patient has been without medication for 9 days now and having full menopausal symptoms again.   Can you please review and give recommendations to an alternative to Estratest?

## 2023-08-31 ENCOUNTER — OFFICE VISIT (OUTPATIENT)
Dept: RHEUMATOLOGY | Age: 48
End: 2023-08-31
Payer: COMMERCIAL

## 2023-08-31 ENCOUNTER — TELEPHONE (OUTPATIENT)
Dept: RHEUMATOLOGY | Age: 48
End: 2023-08-31

## 2023-08-31 VITALS
DIASTOLIC BLOOD PRESSURE: 74 MMHG | HEIGHT: 66 IN | WEIGHT: 259 LBS | HEART RATE: 71 BPM | BODY MASS INDEX: 41.62 KG/M2 | SYSTOLIC BLOOD PRESSURE: 128 MMHG | OXYGEN SATURATION: 98 %

## 2023-08-31 DIAGNOSIS — Z79.899 ENCOUNTER FOR LONG-TERM (CURRENT) USE OF HIGH-RISK MEDICATION: ICD-10-CM

## 2023-08-31 DIAGNOSIS — L40.50 PSORIATIC ARTHRITIS (HCC): Primary | ICD-10-CM

## 2023-08-31 DIAGNOSIS — L40.50 PSORIATIC ARTHRITIS (HCC): ICD-10-CM

## 2023-08-31 LAB
ALBUMIN SERPL BCG-MCNC: 4.4 G/DL (ref 3.5–5.1)
ALP SERPL-CCNC: 97 U/L (ref 38–126)
ALT SERPL W/O P-5'-P-CCNC: 42 U/L (ref 11–66)
ANION GAP SERPL CALC-SCNC: 9 MEQ/L (ref 8–16)
AST SERPL-CCNC: 27 U/L (ref 5–40)
BASOPHILS ABSOLUTE: 0.1 THOU/MM3 (ref 0–0.1)
BASOPHILS NFR BLD AUTO: 0.7 %
BILIRUB SERPL-MCNC: 0.3 MG/DL (ref 0.3–1.2)
BUN SERPL-MCNC: 14 MG/DL (ref 7–22)
CALCIUM SERPL-MCNC: 9.2 MG/DL (ref 8.5–10.5)
CHLORIDE SERPL-SCNC: 105 MEQ/L (ref 98–111)
CO2 SERPL-SCNC: 28 MEQ/L (ref 23–33)
CREAT SERPL-MCNC: 0.7 MG/DL (ref 0.4–1.2)
CRP SERPL-MCNC: 0.42 MG/DL (ref 0–1)
DEPRECATED RDW RBC AUTO: 44.8 FL (ref 35–45)
EOSINOPHIL NFR BLD AUTO: 2.7 %
EOSINOPHILS ABSOLUTE: 0.2 THOU/MM3 (ref 0–0.4)
ERYTHROCYTE [DISTWIDTH] IN BLOOD BY AUTOMATED COUNT: 13.2 % (ref 11.5–14.5)
ERYTHROCYTE [SEDIMENTATION RATE] IN BLOOD BY WESTERGREN METHOD: 4 MM/HR (ref 0–20)
GFR SERPL CREATININE-BSD FRML MDRD: > 60 ML/MIN/1.73M2
GLUCOSE SERPL-MCNC: 100 MG/DL (ref 70–108)
HCT VFR BLD AUTO: 43.9 % (ref 37–47)
HGB BLD-MCNC: 14.7 GM/DL (ref 12–16)
IMM GRANULOCYTES # BLD AUTO: 0.05 THOU/MM3 (ref 0–0.07)
IMM GRANULOCYTES NFR BLD AUTO: 0.7 %
LYMPHOCYTES ABSOLUTE: 2.2 THOU/MM3 (ref 1–4.8)
LYMPHOCYTES NFR BLD AUTO: 29.7 %
MCH RBC QN AUTO: 31.4 PG (ref 26–33)
MCHC RBC AUTO-ENTMCNC: 33.5 GM/DL (ref 32.2–35.5)
MCV RBC AUTO: 93.8 FL (ref 81–99)
MONOCYTES ABSOLUTE: 0.7 THOU/MM3 (ref 0.4–1.3)
MONOCYTES NFR BLD AUTO: 9.2 %
NEUTROPHILS NFR BLD AUTO: 57 %
NRBC BLD AUTO-RTO: 0 /100 WBC
PLATELET # BLD AUTO: 277 THOU/MM3 (ref 130–400)
PMV BLD AUTO: 11.5 FL (ref 9.4–12.4)
POTASSIUM SERPL-SCNC: 4.4 MEQ/L (ref 3.5–5.2)
PROT SERPL-MCNC: 7.3 G/DL (ref 6.1–8)
RBC # BLD AUTO: 4.68 MILL/MM3 (ref 4.2–5.4)
SEGMENTED NEUTROPHILS ABSOLUTE COUNT: 4.2 THOU/MM3 (ref 1.8–7.7)
SODIUM SERPL-SCNC: 142 MEQ/L (ref 135–145)
WBC # BLD AUTO: 7.4 THOU/MM3 (ref 4.8–10.8)

## 2023-08-31 PROCEDURE — 1036F TOBACCO NON-USER: CPT | Performed by: INTERNAL MEDICINE

## 2023-08-31 PROCEDURE — 99204 OFFICE O/P NEW MOD 45 MIN: CPT | Performed by: INTERNAL MEDICINE

## 2023-08-31 PROCEDURE — G8417 CALC BMI ABV UP PARAM F/U: HCPCS | Performed by: INTERNAL MEDICINE

## 2023-08-31 PROCEDURE — G8427 DOCREV CUR MEDS BY ELIG CLIN: HCPCS | Performed by: INTERNAL MEDICINE

## 2023-08-31 RX ORDER — FOLIC ACID 1 MG/1
1 TABLET ORAL DAILY
Qty: 30 TABLET | Refills: 1 | Status: SHIPPED | OUTPATIENT
Start: 2023-08-31 | End: 2023-10-30

## 2023-08-31 RX ORDER — ESTRADIOL 1 MG/1
1 TABLET ORAL DAILY
Qty: 30 TABLET | Refills: 3 | Status: SHIPPED | OUTPATIENT
Start: 2023-08-31

## 2023-08-31 ASSESSMENT — ENCOUNTER SYMPTOMS
SHORTNESS OF BREATH: 0
ABDOMINAL PAIN: 0
VOMITING: 0
COUGH: 0
NAUSEA: 0
BLOOD IN STOOL: 0

## 2023-08-31 NOTE — PROGRESS NOTES
placement; Endometrial ablation; Carpal tunnel release (Bilateral); Hysterectomy, vaginal (N/A, 01/11/2021); Ovary removal (Bilateral, 01/11/2021); Cholecystectomy (02/03/2023); and Hysterectomy. Current Medications:      Current Outpatient Medications:     estradiol (ESTRACE) 1 MG tablet, Take 1 tablet by mouth daily, Disp: 30 tablet, Rfl: 3    PARoxetine (PAXIL) 20 MG tablet, Take 1 tablet by mouth every morning, Disp: , Rfl:     COSENTYX SENSOREADY, 300 MG, 150 MG/ML SOAJ, INJECT 300 MG (2 PENS) SUBCUTANEOUSLY ONCE EVERY 4 WEEKS, Disp: , Rfl:     progesterone (PROMETRIUM) 100 MG CAPS capsule, Take 1 capsule by mouth nightly, Disp: 30 capsule, Rfl: 12    EPINEPHrine (EPIPEN) 0.3 MG/0.3ML SOAJ injection, INJECT 0.3ML INTO THE APPROPRIATE MUSCLE AS NEEDED FOR ALLERGIC REACTION, Disp: , Rfl:     fluocinonide (LIDEX) 0.05 % cream, APPLY CREAM EXTERNALLY TWICE DAILY, Disp: , Rfl:     esomeprazole (NEXIUM) 40 MG delayed release capsule, Take 1 capsule by mouth once daily, Disp: , Rfl:     cetirizine (ZYRTEC) 10 MG tablet, Take 1 tablet by mouth daily, Disp: , Rfl:     cyclobenzaprine (FLEXERIL) 5 MG tablet, Take 1 tablet by mouth 3 times daily as needed (Patient not taking: Reported on 8/31/2023), Disp: , Rfl:     estrogens, conjugated,-methylTESTOSTERone (ESTRATEST) 1.25-2.5 MG per tablet, Take 1 tablet by mouth daily. (Patient not taking: Reported on 8/31/2023), Disp: 30 tablet, Rfl: 5    albuterol (PROVENTIL HFA;VENTOLIN HFA) 108 (90 BASE) MCG/ACT inhaler, Inhale 2 puffs into the lungs 4 times daily as needed (Patient not taking: Reported on 8/31/2023), Disp: , Rfl:     Allergies:    Tremfya [guselkumab], Dextromethorphan-guaifenesin, Aleve [naproxen], Bee venom, Ciprofloxacin, Darvocet a500 [propoxyphene n-acetaminophen], Keflex [cephalexin], Pcn [penicillins], Zithromax [azithromycin], Bactrim [sulfamethoxazole-trimethoprim], and Oxycodone    Social History:     reports that she quit smoking about 19 months ago.

## 2023-08-31 NOTE — TELEPHONE ENCOUNTER
Please inform pt that her labs looked good. I've sent methotrexate to her pharmacy as we discussed in clinic today.

## 2023-08-31 NOTE — TELEPHONE ENCOUNTER
Rx for Estradiol e-prescribed to [facility] to use until able to get back on Estratest.  Patient aware and explained that she needs to keep checking back with pharmacy to see when able to get Estratest again or could pay out of pocket for different NDC. Patient did talk to the pharmacy about out of pocket and it is too expensive. Patient will try the Estradiol and call with any further questions/concerns.

## 2023-09-03 LAB — HLA-B27 QL FC: NEGATIVE

## 2023-10-27 DIAGNOSIS — L40.50 PSORIATIC ARTHRITIS (HCC): ICD-10-CM

## 2023-10-27 RX ORDER — METHOTREXATE 2.5 MG/1
15 TABLET ORAL WEEKLY
Qty: 24 TABLET | Refills: 0 | OUTPATIENT
Start: 2023-10-27

## 2023-10-30 RX ORDER — FOLIC ACID 1 MG/1
1000 TABLET ORAL DAILY
Qty: 30 TABLET | Refills: 0 | OUTPATIENT
Start: 2023-10-30

## 2023-10-30 NOTE — TELEPHONE ENCOUNTER
This is refill request for methotrexate and folic acid. Pt was seen once in August. Cancelled her follow up appointment. Please check with pt if she is still taking methtorexate and folic acid, if refills are needed. Also please schedule follow up appointment.

## 2023-11-16 ENCOUNTER — TELEPHONE (OUTPATIENT)
Dept: RHEUMATOLOGY | Age: 48
End: 2023-11-16

## 2023-11-16 DIAGNOSIS — Z51.81 MEDICATION MONITORING ENCOUNTER: Primary | ICD-10-CM

## 2023-11-16 NOTE — TELEPHONE ENCOUNTER
Pt canceled her appointment today due to writing the wrong time down. From a previous encounter the pt has canceled prior to that. Last seen in August. She has requested refills for medication, but hasn't had labs drawn since August. I did advise her it is important to keep scheduled appointments and that ir would be up to the provider if she would like to refill. She is scheduled for an appointment in Jan. How would you like to proceed?

## 2023-11-17 ENCOUNTER — HOSPITAL ENCOUNTER (OUTPATIENT)
Age: 48
Discharge: HOME | End: 2023-11-17
Payer: COMMERCIAL

## 2023-11-17 DIAGNOSIS — Z51.81 MEDICATION MONITORING ENCOUNTER: ICD-10-CM

## 2023-11-17 LAB
ALBUMIN SERPL-MCNC: 4 G/DL (ref 3.5–5.2)
ALBUMIN/GLOB SERPL: 1.3 {RATIO} (ref 1–2.5)
ALP SERPL-CCNC: 99 U/L (ref 35–104)
ALT SERPL-CCNC: 33 U/L (ref 5–33)
ANION GAP SERPL CALCULATED.3IONS-SCNC: 12 MMOL/L (ref 9–17)
AST SERPL-CCNC: 25 U/L
BASOPHILS # BLD: 0.04 K/UL (ref 0–0.2)
BASOPHILS NFR BLD: 1 % (ref 0–2)
BILIRUB SERPL-MCNC: 0.3 MG/DL (ref 0.3–1.2)
BUN SERPL-MCNC: 12 MG/DL (ref 6–20)
BUN/CREAT SERPL: 15 (ref 9–20)
CALCIUM SERPL-MCNC: 9.2 MG/DL (ref 8.6–10.4)
CHLORIDE SERPL-SCNC: 99 MMOL/L (ref 98–107)
CO2 SERPL-SCNC: 27 MMOL/L (ref 20–31)
CREAT SERPL-MCNC: 0.8 MG/DL (ref 0.5–0.9)
CRP SERPL HS-MCNC: 3.6 MG/L (ref 0–5)
EOSINOPHIL # BLD: 0.16 K/UL (ref 0–0.44)
EOSINOPHILS RELATIVE PERCENT: 2 % (ref 1–4)
ERYTHROCYTE [DISTWIDTH] IN BLOOD BY AUTOMATED COUNT: 13.4 % (ref 11.8–14.4)
ERYTHROCYTE [SEDIMENTATION RATE] IN BLOOD BY PHOTOMETRIC METHOD: 12 MM/HR (ref 0–20)
GFR SERPL CREATININE-BSD FRML MDRD: >60 ML/MIN/1.73M2
GLUCOSE SERPL-MCNC: 93 MG/DL (ref 70–99)
HCT VFR BLD AUTO: 41.8 % (ref 36.3–47.1)
HGB BLD-MCNC: 14.2 G/DL (ref 11.9–15.1)
IMM GRANULOCYTES # BLD AUTO: 0.05 K/UL (ref 0–0.3)
IMM GRANULOCYTES NFR BLD: 1 %
LYMPHOCYTES NFR BLD: 2.12 K/UL (ref 1.1–3.7)
LYMPHOCYTES RELATIVE PERCENT: 31 % (ref 24–43)
MCH RBC QN AUTO: 32.4 PG (ref 25.2–33.5)
MCHC RBC AUTO-ENTMCNC: 34 G/DL (ref 25.2–33.5)
MCV RBC AUTO: 95.4 FL (ref 82.6–102.9)
MONOCYTES NFR BLD: 0.56 K/UL (ref 0.1–1.2)
MONOCYTES NFR BLD: 8 % (ref 3–12)
NEUTROPHILS NFR BLD: 57 % (ref 36–65)
NEUTS SEG NFR BLD: 3.99 K/UL (ref 1.5–8.1)
NRBC BLD-RTO: 0 PER 100 WBC
PLATELET # BLD AUTO: 223 K/UL (ref 138–453)
PMV BLD AUTO: 11.5 FL (ref 8.1–13.5)
POTASSIUM SERPL-SCNC: 4.1 MMOL/L (ref 3.7–5.3)
PROT SERPL-MCNC: 7.1 G/DL (ref 6.4–8.3)
RBC # BLD AUTO: 4.38 M/UL (ref 3.95–5.11)
SODIUM SERPL-SCNC: 138 MMOL/L (ref 135–144)
WBC OTHER # BLD: 6.9 K/UL (ref 3.5–11.3)

## 2023-11-17 PROCEDURE — 80053 COMPREHEN METABOLIC PANEL: CPT

## 2023-11-17 PROCEDURE — 86140 C-REACTIVE PROTEIN: CPT

## 2023-11-17 PROCEDURE — 36415 COLL VENOUS BLD VENIPUNCTURE: CPT

## 2023-11-17 PROCEDURE — 85652 RBC SED RATE AUTOMATED: CPT

## 2023-11-17 PROCEDURE — 85025 COMPLETE CBC W/AUTO DIFF WBC: CPT

## 2023-11-21 ENCOUNTER — PATIENT MESSAGE (OUTPATIENT)
Dept: RHEUMATOLOGY | Age: 48
End: 2023-11-21

## 2023-11-21 ENCOUNTER — TELEPHONE (OUTPATIENT)
Dept: RHEUMATOLOGY | Age: 48
End: 2023-11-21

## 2023-11-21 DIAGNOSIS — L40.50 PSORIATIC ARTHRITIS (HCC): Primary | ICD-10-CM

## 2023-11-21 RX ORDER — METHOTREXATE 2.5 MG/1
15 TABLET ORAL WEEKLY
Qty: 24 TABLET | Refills: 1 | Status: SHIPPED | OUTPATIENT
Start: 2023-11-21 | End: 2024-01-20

## 2023-11-21 RX ORDER — FOLIC ACID 1 MG/1
1 TABLET ORAL DAILY
Qty: 30 TABLET | Refills: 1 | Status: SHIPPED | OUTPATIENT
Start: 2023-11-21 | End: 2024-01-20

## 2023-11-21 NOTE — TELEPHONE ENCOUNTER
Meli is calling in for a refill of her medication for psoriatic arthritis, she couldn't remember the name, to [facility] in [facility]. She had her blood work done late last week. Please advise.

## 2023-11-21 NOTE — TELEPHONE ENCOUNTER
From: Enoc Cook  To: Dr. Eloy Littlejohn: 11/21/2023 12:08 PM EST  Subject: Meds    I was wondering about my meds since I got the blood work done

## 2023-11-21 NOTE — TELEPHONE ENCOUNTER
DOLV: 08/31/23  DONV: 01/18/24  LAST LAB DRAW: 11/17/23  LAST TB TEST: 10/17/22    Lab Results   Component Value Date     11/17/2023    K 4.1 11/17/2023    CL 99 11/17/2023    CO2 27 11/17/2023    BUN 12 11/17/2023    CREATININE 0.8 11/17/2023    GLUCOSE 93 11/17/2023    CALCIUM 9.2 11/17/2023    PROT 7.1 11/17/2023    LABALBU 4.0 11/17/2023    BILITOT 0.3 11/17/2023    ALKPHOS 99 11/17/2023    AST 25 11/17/2023    ALT 33 11/17/2023    LABGLOM >60 11/17/2023    GFRAA >60 02/15/2022       Recent Labs     11/17/23  1519   WBC 6.9   HGB 14.2   HCT 41.8   MCV 95.4          Lab Results   Component Value Date    SEDRATE 12 11/17/2023       Lab Results   Component Value Date    CRP 3.6 11/17/2023

## 2023-11-30 ENCOUNTER — TELEPHONE (OUTPATIENT)
Dept: SURGERY | Age: 48
End: 2023-11-30

## 2023-11-30 NOTE — TELEPHONE ENCOUNTER
Received referral to Dr. Rocio Pena for abdominal lipoma.  Called pt, no answer, LM asking her to return my call to schedule appointment

## 2023-12-27 ENCOUNTER — OFFICE VISIT (OUTPATIENT)
Dept: SURGERY | Age: 48
End: 2023-12-27
Payer: COMMERCIAL

## 2023-12-27 VITALS
DIASTOLIC BLOOD PRESSURE: 82 MMHG | BODY MASS INDEX: 45.98 KG/M2 | OXYGEN SATURATION: 98 % | WEIGHT: 276 LBS | HEART RATE: 83 BPM | TEMPERATURE: 97.7 F | RESPIRATION RATE: 18 BRPM | SYSTOLIC BLOOD PRESSURE: 136 MMHG | HEIGHT: 65 IN

## 2023-12-27 DIAGNOSIS — D17.1 LIPOMA OF TORSO: Primary | ICD-10-CM

## 2023-12-27 PROCEDURE — 1036F TOBACCO NON-USER: CPT | Performed by: SURGERY

## 2023-12-27 PROCEDURE — 99203 OFFICE O/P NEW LOW 30 MIN: CPT | Performed by: SURGERY

## 2023-12-27 PROCEDURE — G8484 FLU IMMUNIZE NO ADMIN: HCPCS | Performed by: SURGERY

## 2023-12-27 PROCEDURE — G8427 DOCREV CUR MEDS BY ELIG CLIN: HCPCS | Performed by: SURGERY

## 2023-12-27 PROCEDURE — G8417 CALC BMI ABV UP PARAM F/U: HCPCS | Performed by: SURGERY

## 2023-12-29 NOTE — H&P (VIEW-ONLY)
Lisa Wilson (:  1975)     ASSESSMENT:  1.  Left lower abdominal wall/flank subcutaneous mass/lipoma  2.  Morbid obesity (BMI 45)    PLAN:  1. Schedule Lisa for excision left lower abdominal wall/flank subcutaneous mass/lipoma.  2. She will undergo pre-operative clearance per anesthesia guidelines with risk factors listed under the past medical history diagnosis & problem list.  3. The risks, benefits and alternatives were discussed with Lisa including non-operative management. All questions answered. She understands and wishes to proceed with surgical intervention.  4. Restrictions discussed with Lisa and she expresses understanding.  5. She is advised to call back directly if there are further questions/concerns, or if her symptoms worsen prior to surgery.    SUBJECTIVE/OBJECTIVE:    Chief Complaint   Patient presents with    Surgical Consult     NP ref by Nannette Blair, CNP - Abdominal lipoma     HPI  Lisa is a 48-year-old female who presents for initial evaluation secondary to a subcutaneous mass/lipoma.  She states this is located in her left lower lateral abdominal wall/flank region.  About 3-4 cm in diameter.  Soft and mobile.  Gives her a mild to moderate amount of tenderness especially with palpation or when it is rubbed against.  Minimal skin discoloration.  No signs of infection or history of drainage.  No history of trauma to the area.  Denies any other skin or subcutaneous lesions that she is aware of.  BMI 45.  Current weight 276 pounds.  Denies generalized abdominal pain.  No chest pain or shortness of breath.  No urinary complaints.  Otherwise doing well.    Review of Systems   Constitutional:  Negative for activity change, appetite change, chills, diaphoresis, fatigue, fever and unexpected weight change.   HENT:  Negative for congestion, dental problem, drooling, ear discharge, ear pain, facial swelling, hearing loss, mouth sores, nosebleeds, postnasal drip, rhinorrhea,

## 2024-01-03 ENCOUNTER — OFFICE VISIT (OUTPATIENT)
Dept: OBGYN CLINIC | Age: 49
End: 2024-01-03
Payer: COMMERCIAL

## 2024-01-03 VITALS — DIASTOLIC BLOOD PRESSURE: 76 MMHG | SYSTOLIC BLOOD PRESSURE: 118 MMHG | BODY MASS INDEX: 45.76 KG/M2 | WEIGHT: 275 LBS

## 2024-01-03 DIAGNOSIS — N39.41 URGE INCONTINENCE: ICD-10-CM

## 2024-01-03 DIAGNOSIS — N39.3 STRESS INCONTINENCE: Primary | ICD-10-CM

## 2024-01-03 PROCEDURE — G8417 CALC BMI ABV UP PARAM F/U: HCPCS | Performed by: OBSTETRICS & GYNECOLOGY

## 2024-01-03 PROCEDURE — G8427 DOCREV CUR MEDS BY ELIG CLIN: HCPCS | Performed by: OBSTETRICS & GYNECOLOGY

## 2024-01-03 PROCEDURE — 99213 OFFICE O/P EST LOW 20 MIN: CPT | Performed by: OBSTETRICS & GYNECOLOGY

## 2024-01-03 PROCEDURE — G8484 FLU IMMUNIZE NO ADMIN: HCPCS | Performed by: OBSTETRICS & GYNECOLOGY

## 2024-01-03 PROCEDURE — 1036F TOBACCO NON-USER: CPT | Performed by: OBSTETRICS & GYNECOLOGY

## 2024-01-03 RX ORDER — PROGESTERONE 100 MG/1
100 CAPSULE ORAL NIGHTLY
COMMUNITY
Start: 2023-12-31

## 2024-01-03 RX ORDER — OXYBUTYNIN CHLORIDE 10 MG/1
10 TABLET, EXTENDED RELEASE ORAL DAILY
Qty: 30 TABLET | Refills: 1 | Status: SHIPPED | OUTPATIENT
Start: 2024-01-03

## 2024-01-03 RX ORDER — ESTRADIOL 1 MG/1
1 TABLET ORAL DAILY
COMMUNITY
Start: 2023-12-31

## 2024-01-03 NOTE — PROGRESS NOTES
DATE OF VISIT:  1/3/24    PATIENT NAME:  Lisa Wilson     YOB: 1975    REASON FOR VISIT:    Chief Complaint   Patient presents with    Incontinence     Patient is scheduled for tomorrow for lipoma removal of LLQ.  Patient reports stress incontinence and will leak large amounts. She will stand up and she will leak. Symptoms have gotten really bad since approx June.  In June she hurt her back at work and then gained a lot of weight which she feels worsened the problem.         HISTORY OF PRESENT ILLNESS:  Pt states that she has had increasing stress incontinence since hurting her back at work and gaining weight; has some urge symptoms and leaks with laugh/sneeze/cough/standing from seated position; has total loss of control at times; disc'd OAB and KAIN; disc'd trial of anticholinergic and seeing how symptoms are; disc'd sling if patient interested in surgical intervention; disc'd PT as option as well        Patient's last menstrual period was 08/31/2020.  Vitals:    01/03/24 1438   BP: 118/76   Site: Right Upper Arm   Position: Sitting   Weight: 124.7 kg (275 lb)     Body mass index is 45.76 kg/m².  Allergies   Allergen Reactions    Tremfya [Guselkumab] Anaphylaxis and Hives     Shock, body aches    Dextromethorphan-Guaifenesin Rash     Other reaction(s): Dizziness    Aleve [Naproxen] Hives     Gets hives from Aleve and Excedrin migraine or any migraine medications but can take other naproxen products    Bee Venom Swelling    Ciprofloxacin Hives    Darvocet A500 [Propoxyphene N-Acetaminophen]      Hallucinate and sleep     Keflex [Cephalexin] Hives    Pcn [Penicillins] Hives    Zithromax [Azithromycin] Hives    Bactrim [Sulfamethoxazole-Trimethoprim] Hives and Rash    Oxycodone Rash     Current Outpatient Medications   Medication Sig Dispense Refill    estradiol (ESTRACE) 1 MG tablet Take 1 tablet by mouth daily      progesterone (PROMETRIUM) 100 MG CAPS capsule Take 1 capsule by mouth nightly

## 2024-01-04 ENCOUNTER — PREP FOR PROCEDURE (OUTPATIENT)
Dept: SURGERY | Age: 49
End: 2024-01-04

## 2024-01-04 ENCOUNTER — ANESTHESIA EVENT (OUTPATIENT)
Dept: OPERATING ROOM | Age: 49
End: 2024-01-04
Payer: COMMERCIAL

## 2024-01-04 ENCOUNTER — ANESTHESIA (OUTPATIENT)
Dept: OPERATING ROOM | Age: 49
End: 2024-01-04
Payer: COMMERCIAL

## 2024-01-04 ENCOUNTER — HOSPITAL ENCOUNTER (OUTPATIENT)
Age: 49
Setting detail: OUTPATIENT SURGERY
Discharge: HOME | End: 2024-01-04
Attending: SURGERY | Admitting: SURGERY
Payer: COMMERCIAL

## 2024-01-04 VITALS
OXYGEN SATURATION: 98 % | HEIGHT: 68 IN | WEIGHT: 274.6 LBS | SYSTOLIC BLOOD PRESSURE: 119 MMHG | DIASTOLIC BLOOD PRESSURE: 57 MMHG | TEMPERATURE: 97.1 F | BODY MASS INDEX: 41.62 KG/M2 | RESPIRATION RATE: 18 BRPM | HEART RATE: 84 BPM

## 2024-01-04 DIAGNOSIS — D17.9 LIPOMA, UNSPECIFIED SITE: ICD-10-CM

## 2024-01-04 DIAGNOSIS — D17.1 LIPOMA OF TORSO: Primary | ICD-10-CM

## 2024-01-04 PROCEDURE — 7100000000 HC PACU RECOVERY - FIRST 15 MIN: Performed by: SURGERY

## 2024-01-04 PROCEDURE — 6360000002 HC RX W HCPCS: Performed by: NURSE ANESTHETIST, CERTIFIED REGISTERED

## 2024-01-04 PROCEDURE — 22903 EXC ABD LES SC 3 CM/>: CPT | Performed by: SURGERY

## 2024-01-04 PROCEDURE — 3700000001 HC ADD 15 MINUTES (ANESTHESIA): Performed by: SURGERY

## 2024-01-04 PROCEDURE — 7100000001 HC PACU RECOVERY - ADDTL 15 MIN: Performed by: SURGERY

## 2024-01-04 PROCEDURE — 2580000003 HC RX 258: Performed by: SURGERY

## 2024-01-04 PROCEDURE — 7100000011 HC PHASE II RECOVERY - ADDTL 15 MIN: Performed by: SURGERY

## 2024-01-04 PROCEDURE — 88304 TISSUE EXAM BY PATHOLOGIST: CPT

## 2024-01-04 PROCEDURE — 3600000012 HC SURGERY LEVEL 2 ADDTL 15MIN: Performed by: SURGERY

## 2024-01-04 PROCEDURE — 3700000000 HC ANESTHESIA ATTENDED CARE: Performed by: SURGERY

## 2024-01-04 PROCEDURE — 7100000010 HC PHASE II RECOVERY - FIRST 15 MIN: Performed by: SURGERY

## 2024-01-04 PROCEDURE — 3600000002 HC SURGERY LEVEL 2 BASE: Performed by: SURGERY

## 2024-01-04 PROCEDURE — 2709999900 HC NON-CHARGEABLE SUPPLY: Performed by: SURGERY

## 2024-01-04 PROCEDURE — 6360000002 HC RX W HCPCS: Performed by: SURGERY

## 2024-01-04 RX ORDER — SODIUM CHLORIDE 9 MG/ML
INJECTION, SOLUTION INTRAVENOUS PRN
Status: CANCELLED | OUTPATIENT
Start: 2024-01-04

## 2024-01-04 RX ORDER — ACETAMINOPHEN 325 MG/1
650 TABLET ORAL EVERY 4 HOURS PRN
Status: CANCELLED | OUTPATIENT
Start: 2024-01-04

## 2024-01-04 RX ORDER — SODIUM CHLORIDE 9 MG/ML
INJECTION, SOLUTION INTRAVENOUS CONTINUOUS
Status: CANCELLED | OUTPATIENT
Start: 2024-01-04

## 2024-01-04 RX ORDER — KETOROLAC TROMETHAMINE 10 MG/1
10 TABLET, FILM COATED ORAL EVERY 8 HOURS PRN
Qty: 15 TABLET | Refills: 0 | Status: SHIPPED | OUTPATIENT
Start: 2024-01-04

## 2024-01-04 RX ORDER — ONDANSETRON 4 MG/1
4 TABLET, ORALLY DISINTEGRATING ORAL EVERY 8 HOURS PRN
Status: CANCELLED | OUTPATIENT
Start: 2024-01-04

## 2024-01-04 RX ORDER — MORPHINE SULFATE 4 MG/ML
4 INJECTION, SOLUTION INTRAMUSCULAR; INTRAVENOUS
Status: CANCELLED | OUTPATIENT
Start: 2024-01-04

## 2024-01-04 RX ORDER — SODIUM CHLORIDE 0.9 % (FLUSH) 0.9 %
5-40 SYRINGE (ML) INJECTION PRN
Status: CANCELLED | OUTPATIENT
Start: 2024-01-04

## 2024-01-04 RX ORDER — PROPOFOL 10 MG/ML
INJECTION, EMULSION INTRAVENOUS PRN
Status: DISCONTINUED | OUTPATIENT
Start: 2024-01-04 | End: 2024-01-04 | Stop reason: SDUPTHER

## 2024-01-04 RX ORDER — TRAMADOL HYDROCHLORIDE 50 MG/1
100 TABLET ORAL EVERY 6 HOURS PRN
Status: DISCONTINUED | OUTPATIENT
Start: 2024-01-04 | End: 2024-01-04 | Stop reason: HOSPADM

## 2024-01-04 RX ORDER — TRAMADOL HYDROCHLORIDE 50 MG/1
50 TABLET ORAL EVERY 6 HOURS PRN
Status: DISCONTINUED | OUTPATIENT
Start: 2024-01-04 | End: 2024-01-04 | Stop reason: HOSPADM

## 2024-01-04 RX ORDER — HYDROCODONE BITARTRATE AND ACETAMINOPHEN 5; 325 MG/1; MG/1
1-2 TABLET ORAL EVERY 6 HOURS PRN
Qty: 20 TABLET | Refills: 0 | Status: SHIPPED | OUTPATIENT
Start: 2024-01-04 | End: 2024-01-10

## 2024-01-04 RX ORDER — SODIUM CHLORIDE 0.9 % (FLUSH) 0.9 %
5-40 SYRINGE (ML) INJECTION EVERY 12 HOURS SCHEDULED
Status: CANCELLED | OUTPATIENT
Start: 2024-01-04

## 2024-01-04 RX ORDER — SODIUM CHLORIDE 9 MG/ML
INJECTION, SOLUTION INTRAVENOUS CONTINUOUS
Status: DISCONTINUED | OUTPATIENT
Start: 2024-01-04 | End: 2024-01-04 | Stop reason: HOSPADM

## 2024-01-04 RX ORDER — MORPHINE SULFATE 2 MG/ML
2 INJECTION, SOLUTION INTRAMUSCULAR; INTRAVENOUS
Status: CANCELLED | OUTPATIENT
Start: 2024-01-04

## 2024-01-04 RX ORDER — LIDOCAINE HYDROCHLORIDE 20 MG/ML
INJECTION, SOLUTION INTRAVENOUS PRN
Status: DISCONTINUED | OUTPATIENT
Start: 2024-01-04 | End: 2024-01-04 | Stop reason: SDUPTHER

## 2024-01-04 RX ORDER — FAMOTIDINE 20 MG/1
20 TABLET, FILM COATED ORAL 2 TIMES DAILY
Status: CANCELLED | OUTPATIENT
Start: 2024-01-04

## 2024-01-04 RX ORDER — MIDAZOLAM HYDROCHLORIDE 1 MG/ML
INJECTION INTRAMUSCULAR; INTRAVENOUS PRN
Status: DISCONTINUED | OUTPATIENT
Start: 2024-01-04 | End: 2024-01-04 | Stop reason: SDUPTHER

## 2024-01-04 RX ORDER — ONDANSETRON 2 MG/ML
INJECTION INTRAMUSCULAR; INTRAVENOUS PRN
Status: DISCONTINUED | OUTPATIENT
Start: 2024-01-04 | End: 2024-01-04 | Stop reason: SDUPTHER

## 2024-01-04 RX ORDER — FENTANYL CITRATE 50 UG/ML
INJECTION, SOLUTION INTRAMUSCULAR; INTRAVENOUS PRN
Status: DISCONTINUED | OUTPATIENT
Start: 2024-01-04 | End: 2024-01-04 | Stop reason: SDUPTHER

## 2024-01-04 RX ORDER — DEXAMETHASONE SODIUM PHOSPHATE 10 MG/ML
INJECTION, EMULSION INTRAMUSCULAR; INTRAVENOUS PRN
Status: DISCONTINUED | OUTPATIENT
Start: 2024-01-04 | End: 2024-01-04 | Stop reason: SDUPTHER

## 2024-01-04 RX ORDER — BUPIVACAINE HYDROCHLORIDE 5 MG/ML
INJECTION, SOLUTION EPIDURAL; INTRACAUDAL PRN
Status: DISCONTINUED | OUTPATIENT
Start: 2024-01-04 | End: 2024-01-04 | Stop reason: ALTCHOICE

## 2024-01-04 RX ORDER — ONDANSETRON 2 MG/ML
4 INJECTION INTRAMUSCULAR; INTRAVENOUS EVERY 6 HOURS PRN
Status: CANCELLED | OUTPATIENT
Start: 2024-01-04

## 2024-01-04 RX ADMIN — DEXAMETHASONE SODIUM PHOSPHATE 10 MG: 10 INJECTION, EMULSION INTRAMUSCULAR; INTRAVENOUS at 12:35

## 2024-01-04 RX ADMIN — SODIUM CHLORIDE: 9 INJECTION, SOLUTION INTRAVENOUS at 10:01

## 2024-01-04 RX ADMIN — FENTANYL CITRATE 100 MCG: 50 INJECTION, SOLUTION INTRAMUSCULAR; INTRAVENOUS at 12:26

## 2024-01-04 RX ADMIN — ONDANSETRON 4 MG: 2 INJECTION INTRAMUSCULAR; INTRAVENOUS at 12:35

## 2024-01-04 RX ADMIN — LIDOCAINE HYDROCHLORIDE 100 MG: 20 INJECTION, SOLUTION INTRAVENOUS at 12:26

## 2024-01-04 RX ADMIN — PROPOFOL 200 MG: 10 INJECTION, EMULSION INTRAVENOUS at 12:26

## 2024-01-04 RX ADMIN — MIDAZOLAM 2 MG: 1 INJECTION INTRAMUSCULAR; INTRAVENOUS at 12:22

## 2024-01-04 ASSESSMENT — PAIN - FUNCTIONAL ASSESSMENT: PAIN_FUNCTIONAL_ASSESSMENT: 0-10

## 2024-01-04 ASSESSMENT — PAIN SCALES - GENERAL
PAINLEVEL_OUTOF10: 2

## 2024-01-04 NOTE — PROGRESS NOTES
Pt returned to [facility] room 12. Vitals and assessment as charted. 0.9 infusing, @500ml to count from PACU. Pt has muffin and starry. Family at the bedside. Pt and family verbalized understanding of discharge criteria and call light use. Call light in reach.

## 2024-01-04 NOTE — ANESTHESIA POSTPROCEDURE EVALUATION
Department of Anesthesiology  Postprocedure Note    Patient: Lisa Wilson  MRN: [id number]  YOB: 1975  Date of evaluation: 1/4/2024    Procedure Summary       Date: 01/04/24 Room / Location: [facility] OR 05 / [facility] OR    Anesthesia Start: 1221 Anesthesia Stop: 1302    Procedure: Excision Left Lower Flank Lipoma (Left) Diagnosis:       Lipoma, unspecified site      (Lipoma, unspecified site [D17.9])    Surgeons: Gordon Dangelo MD Responsible Provider: Don Perez MD    Anesthesia Type: general, TIVA ASA Status: 2            Anesthesia Type: No value filed.    Minh Phase I: Minh Score: 10    Minh Phase II: Minh Score: 10    Anesthesia Post Evaluation    Patient location during evaluation: PACU  Patient participation: complete - patient participated  Level of consciousness: awake and alert  Airway patency: patent  Nausea & Vomiting: no nausea  Cardiovascular status: blood pressure returned to baseline and hemodynamically stable  Respiratory status: acceptable and spontaneous ventilation  Hydration status: euvolemic  Pain management: adequate    No notable events documented.

## 2024-01-04 NOTE — PROGRESS NOTES
Pt admitted to [facility] room 12 and oriented to unit. SCD sleeves applied. Nares swabbed. Pt verbalized permission for first name, last initial and physicians name on white board. SDS board and discharge criteria explained, pt and family verbalized understanding. Pt denies thoughts of harming self or others. Call light in reach. Family at the bedside. Mika [phone]

## 2024-01-04 NOTE — ANESTHESIA PRE PROCEDURE
Allergies   Allergen Reactions   • Tremfya [Guselkumab] Anaphylaxis and Hives     Shock, body aches   • Dextromethorphan-Guaifenesin Rash     Other reaction(s): Dizziness   • Aleve [Naproxen] Hives     Gets hives from Aleve and Excedrin migraine or any migraine medications but can take other naproxen products   • Bee Venom Swelling   • Ciprofloxacin Hives   • Darvocet A500 [Propoxyphene N-Acetaminophen]      Hallucinate and sleep    • Keflex [Cephalexin] Hives   • Pcn [Penicillins] Hives   • Zithromax [Azithromycin] Hives   • Bactrim [Sulfamethoxazole-Trimethoprim] Hives and Rash   • Oxycodone Rash       Problem List:    Patient Active Problem List   Diagnosis Code   • Depressive disorder F32.A   • Fall at home W19.XXXA, Y92.009   • CTS (carpal tunnel syndrome) G56.00   • Peripheral neuropathy G62.9   • Neck pain M54.2   • Cervical radicular pain M54.12   • Balance problem R26.89   • Anxiety F41.9   • Asthma J45.909   • Migraine G43.909   • Current smoker F17.200   • Headache R51.9   • Bleeding tendency (HCC) D69.9   • Easy bruising R23.3   • Family history of lupus anticoagulant disorder Z83.2   • Post-op pain G89.18   • Menorrhagia with regular cycle N92.0   • Dysmenorrhea N94.6       Past Medical History:        Diagnosis Date   • Abnormal Pap smear of cervix    • Active smoker    • Anxiety    • Asthma    • Asthma    • Balance problem    • Bronchitis    • Cervical radicular pain    • CTS (carpal tunnel syndrome)    • Depressive disorder    • Fall at home    • Headache    • Migraine    • Multiple allergies    • Neck pain    • Peripheral neuropathy    • Pneumonia    • Psoriasis        Past Surgical History:        Procedure Laterality Date   • CARPAL TUNNEL RELEASE Bilateral    • CERVIX LESION DESTRUCTION     • CHOLECYSTECTOMY  02/03/2023    Dr. Lindsey - [place]   • ENDOMETRIAL ABLATION     • HYSTERECTOMY (CERVIX STATUS UNKNOWN)     • HYSTERECTOMY, VAGINAL N/A 01/11/2021    HYSTERECTOMY VAGINAL LAPAROSCOPIC

## 2024-01-04 NOTE — BRIEF OP NOTE
Brief Postoperative Note      Patient: Lisa Wilson  YOB: 1975  MRN: [id number]    Date of Procedure: 1/4/2024    Preoperative diagnosis: Left lower abdominal wall/flank subcutaneous mass/lipoma    Post-Op Diagnosis: Same       Procedure(s):  Excision Left Lower Flank Lipoma (4.4 cm)    Surgeon(s):  Gordon Dangelo MD    Assistant:  First Assistant: Tyree Marroquin RN    Anesthesia: Monitor Anesthesia Care/local    Estimated Blood Loss (mL): 2ml    Complications: None    Specimens:   ID Type Source Tests Collected by Time Destination   A : left flank lipooma Tissue Buttock SURGICAL PATHOLOGY Gordon Dangelo MD 1/4/2024 1241        Implants:  * No implants in log *      Drains: * No LDAs found *    Findings: as above - see op note for details    Electronically signed by Gordon Dangelo MD on 1/4/2024 at 12:49 PM

## 2024-01-04 NOTE — PROGRESS NOTES
1257- pt to pacu. VSS. Pt with oral airway in place, minimally responsive to verbal ques.     1305- pt reports burning incisional pain. Denies need for pain medication.    1318- pt resting in bed eyes open. Continues to report tolerable pain. VSS.     1327-  Pt meets criteria for discharge from pacu.    1332- Pt returned to [facility], pt left in room with spouse in stable condition. report given to Jessica

## 2024-01-04 NOTE — PROGRESS NOTES

## 2024-01-04 NOTE — OP NOTE
[place] [id number]                                OPERATIVE REPORT    PATIENT NAME: LEIGHTON WOODARD                     :        1975  MED REC NO:   [id number]                           ROOM:  ACCOUNT NO:   [id number]                           ADMIT DATE: 2024  PROVIDER:     Gordon Dangelo M.D.    DATE OF PROCEDURE:  2024    PREOPERATIVE DIAGNOSIS:  Left lower abdominal wall/flank subcutaneous  mass/lipoma.    POSTOPERATIVE DIAGNOSIS:  Left lower abdominal wall/flank subcutaneous  mass/lipoma.    PROCEDURE:  Excision of left lower abdominal wall/flank subcutaneous  mass/lipoma (4 x 4 cm).    SURGEON:  Gordon Dangelo MD    ASSISTANT:  KAILEE Greene    ANESTHESIA:  IV sedation/local.    ESTIMATED BLOOD LOSS:  2 mL.    DRAINS:  None.    COMPLICATIONS:  None.    DISPOSITION:  Stable to the recovery room.    INDICATIONS:  The patient is a 48-year-old female, who was found to have  a left lower abdominal wall/flank subcutaneous mass.  Concern for  lipoma.  Increasing in size and giving discomfort.  Both operative and  nonoperative intervention plans were discussed.  Risks of surgery were  further discussed.  Some of the risks included but were not limited to  bleeding, infection, the need for reoperation, severe chronic  postoperative pain or numbness, major vascular or nerve injury,  cardiopulmonary complications, anesthetic complications, seroma or  hematoma formation, wound breakdown, recurrence of the lesion, chronic  pain and death.  After all of the questions were answered in their  entirety and the patient was completely aware of the current situation,  she wished to proceed with surgery.    DESCRIPTION OF PROCEDURE:  After informed consent was signed and placed  on the chart, the patient was taken back to the operating room and  placed supine on the operating room table.  IV sedation

## 2024-01-04 NOTE — INTERVAL H&P NOTE
Update History & Physical    The patient's History and Physical was reviewed with the patient and I examined the patient. There was no change. The surgical site was confirmed by the patient and me.     Plan: The risks, benefits, expected outcome, and alternative to the recommended procedure have been discussed with the patient. Patient understands and wants to proceed with the procedure.     Electronically signed by Gordon Dangelo MD on 1/4/2024 at 9:54 AM

## 2024-01-04 NOTE — DISCHARGE INSTRUCTIONS
DR. SPARKS'S DISCHARGE INSTRUCTIONS    Pt Name: Lisa Wilson  Medical Record Number: [id number]  Today's Date: 1/4/2024    GENERAL ANESTHESIA OR SEDATION  1. Do not drive or operate hazardous machinery for 24 hours.  2. Do not make important business or personal decisions for 24 hours.  3. Do not drink alcoholic beverages or use tobacco for 24 hours.    ACTIVITY INSTRUCTIONS:  [] Rest today. Resume light to normal activity tomorrow.   [x] You may resume normal activity tomorrow. Do not engage in strenuous activity that may place stress on your incision.  [x] Do not drive for 3-5 days and avoid heavy lifting, tugging, pullings greater than 10-20 lbs until seen in the office.      DIET INSTRUCTIONS:  [x]Begin with clear liquids. If not nauseated, may increase to a low-fat diet when you desire. Greasy and spicy foods are not advised.  [x]Regular diet as tolerated.  []Other:     MEDICATIONS  [x]Prescription sent with you to be used as directed.   []Valium   [x]Norco   []Percocet   [x]Toradol   []Oxycontin     Do not drink alcohol or drive while taking these medications. You may experience dizziness or drowsiness with these medications. You may also experience constipation which can be relieved with stool softners or laxatives.    - Take Colace 100 mg 1-2 tabs daily as needed for constipation  - Take MiraLax 1-2 cap fulls daily as needed for constipation    [x]You may resume your daily prescription medication schedule unless otherwise specified.  []Do not take 325mg Aspirin or other blood thinners such as Coumadin or Plavix for 5 days.    **Pain medication at discharge - use only as prescribed- refills may be available to you at your follow up appointments if needed and warranted.  Narcotics should be used for only short term and we highly encouraged our patients to wean off appropriately and use other means for pain such as non pharmacologic measures and over the counter tylenol or ibuprofen if no restrictions  apply. We do  know that surgical pain is real and will not hesitate to help eliminate some of your discomfort. However we will not be able to completely make you pain free and it is important to determine what pain level is tolerable for you **    WOUND/DRESSING INSTRUCTIONS:  Always ensure you and your care giver clean hands before and after caring for the wound.  [x] Keep dressing clean and dry for 24 hours. Change when soiled or wet.      [x] Allow steri-strips to fall off on their own.   [x] Ice operative site as needed for 20 minutes 4 times a day.     [x] May wash over incision in shower in 24 hours, but do not soak in a bath.  [] Take sitz bath for 20 minutes twice daily and after bowel movements.  [] Keep the abdominal binder in place during the day. May remove to shower and at night.  [] Remove packing from wound in 24 hours and replace daily.  [] Empty JACKIE drain daily and record the amounts.    ABDOMINAL/LAPAROSCOPIC SURGERY  [x]You are encouraged to get up and move around as this helps with the circulation and speeds up the healing process.  [x]Breath deeply and cough from time to time. This helps to clear your lungs and helps prevent pneumonia.  [x]Supporting your incision with a pillow or your hand helps to minimize discomfort and pain.  []Robotic/Laparoscopic patients may develop shoulder pain in the first 48 hours from the gas used during the procedure.    FOLLOW-UP CARE. SPECIFICALLY WATCH FOR:   Fever over 101 degrees by mouth   Increased redness, warmth, hardness at operative site.   Blood soaked dressing (small amounts of oozing may be normal.)   Increased or progressive drainage from the surgical area   Inability to urinate or blood in the urine   Pain not relieved by the medications ordered   Persistent nausea and/or vomiting, unable to retain fluids.   Pain or swelling in your legs.   Shortness of breath.    FOLLOW-UP APPOINTMENT   []1 week   [x]2 weeks   []Other    Call my office if you have

## 2024-01-05 ENCOUNTER — TELEPHONE (OUTPATIENT)
Dept: SURGERY | Age: 49
End: 2024-01-05

## 2024-01-09 DIAGNOSIS — Z79.899 ENCOUNTER FOR LONG-TERM (CURRENT) USE OF HIGH-RISK MEDICATION: Primary | ICD-10-CM

## 2024-01-09 DIAGNOSIS — L40.50 PSORIATIC ARTHRITIS (HCC): ICD-10-CM

## 2024-01-09 RX ORDER — METHOTREXATE 2.5 MG/1
15 TABLET ORAL WEEKLY
Qty: 24 TABLET | Refills: 0 | Status: SHIPPED | OUTPATIENT
Start: 2024-01-09 | End: 2024-03-09

## 2024-01-09 NOTE — TELEPHONE ENCOUNTER
Pt is requesting a refill for mtx. Last appointment was 8/31/23. She missed her f/u amd is scheduled on 1/18/24

## 2024-01-09 NOTE — TELEPHONE ENCOUNTER
Please advise pt to get labs. We need CBC, CMP. Orders are in the chart.   I've sent a month supply and will give further refills after review of labs.    Thank you

## 2024-01-17 DIAGNOSIS — L40.50 PSORIATIC ARTHRITIS (HCC): ICD-10-CM

## 2024-01-17 RX ORDER — FOLIC ACID 1 MG/1
1000 TABLET ORAL DAILY
Qty: 30 TABLET | Refills: 0 | Status: SHIPPED | OUTPATIENT
Start: 2024-01-17

## 2024-01-25 RX ORDER — ESTRADIOL 1 MG/1
1 TABLET ORAL DAILY
Qty: 30 TABLET | Refills: 0 | Status: SHIPPED | OUTPATIENT
Start: 2024-01-25

## 2024-02-23 DIAGNOSIS — L40.50 PSORIATIC ARTHRITIS (HCC): ICD-10-CM

## 2024-02-23 RX ORDER — METHOTREXATE 2.5 MG/1
15 TABLET ORAL WEEKLY
Qty: 24 TABLET | Refills: 0 | OUTPATIENT
Start: 2024-02-23 | End: 2024-04-23

## 2024-03-01 ENCOUNTER — HOSPITAL ENCOUNTER (OUTPATIENT)
Age: 49
Discharge: HOME | End: 2024-03-01
Payer: COMMERCIAL

## 2024-03-01 DIAGNOSIS — L40.50 PSORIATIC ARTHRITIS (HCC): ICD-10-CM

## 2024-03-01 DIAGNOSIS — Z79.899 ENCOUNTER FOR LONG-TERM (CURRENT) USE OF HIGH-RISK MEDICATION: ICD-10-CM

## 2024-03-01 LAB
ALBUMIN SERPL-MCNC: 4 G/DL (ref 3.5–5.2)
ALBUMIN/GLOB SERPL: 1.4 {RATIO} (ref 1–2.5)
ALP SERPL-CCNC: 107 U/L (ref 35–104)
ALT SERPL-CCNC: 16 U/L (ref 5–33)
ANION GAP SERPL CALCULATED.3IONS-SCNC: 10 MMOL/L (ref 9–17)
AST SERPL-CCNC: 16 U/L
BASOPHILS # BLD: 0.04 K/UL (ref 0–0.2)
BASOPHILS NFR BLD: 1 % (ref 0–2)
BILIRUB SERPL-MCNC: 0.4 MG/DL (ref 0.3–1.2)
BUN SERPL-MCNC: 12 MG/DL (ref 6–20)
BUN/CREAT SERPL: 15 (ref 9–20)
CALCIUM SERPL-MCNC: 8.8 MG/DL (ref 8.6–10.4)
CHLORIDE SERPL-SCNC: 104 MMOL/L (ref 98–107)
CO2 SERPL-SCNC: 26 MMOL/L (ref 20–31)
CREAT SERPL-MCNC: 0.8 MG/DL (ref 0.5–0.9)
CRP SERPL HS-MCNC: 10.7 MG/L (ref 0–5)
EOSINOPHIL # BLD: 0.12 K/UL (ref 0–0.44)
EOSINOPHILS RELATIVE PERCENT: 2 % (ref 1–4)
ERYTHROCYTE [DISTWIDTH] IN BLOOD BY AUTOMATED COUNT: 13.5 % (ref 11.8–14.4)
ERYTHROCYTE [SEDIMENTATION RATE] IN BLOOD BY PHOTOMETRIC METHOD: 5 MM/HR (ref 0–20)
GFR SERPL CREATININE-BSD FRML MDRD: >60 ML/MIN/1.73M2
GLUCOSE SERPL-MCNC: 94 MG/DL (ref 70–99)
HCT VFR BLD AUTO: 38.1 % (ref 36.3–47.1)
HGB BLD-MCNC: 13.2 G/DL (ref 11.9–15.1)
IMM GRANULOCYTES # BLD AUTO: 0.03 K/UL (ref 0–0.3)
IMM GRANULOCYTES NFR BLD: 1 %
LYMPHOCYTES NFR BLD: 1.93 K/UL (ref 1.1–3.7)
LYMPHOCYTES RELATIVE PERCENT: 33 % (ref 24–43)
MCH RBC QN AUTO: 32 PG (ref 25.2–33.5)
MCHC RBC AUTO-ENTMCNC: 34.6 G/DL (ref 25.2–33.5)
MCV RBC AUTO: 92.5 FL (ref 82.6–102.9)
MONOCYTES NFR BLD: 0.54 K/UL (ref 0.1–1.2)
MONOCYTES NFR BLD: 9 % (ref 3–12)
NEUTROPHILS NFR BLD: 54 % (ref 36–65)
NEUTS SEG NFR BLD: 3.26 K/UL (ref 1.5–8.1)
NRBC BLD-RTO: 0 PER 100 WBC
PLATELET # BLD AUTO: 234 K/UL (ref 138–453)
PMV BLD AUTO: 10.5 FL (ref 8.1–13.5)
POTASSIUM SERPL-SCNC: 4.1 MMOL/L (ref 3.7–5.3)
PROT SERPL-MCNC: 6.8 G/DL (ref 6.4–8.3)
RBC # BLD AUTO: 4.12 M/UL (ref 3.95–5.11)
SODIUM SERPL-SCNC: 140 MMOL/L (ref 135–144)
WBC OTHER # BLD: 5.9 K/UL (ref 3.5–11.3)

## 2024-03-01 PROCEDURE — 36415 COLL VENOUS BLD VENIPUNCTURE: CPT

## 2024-03-01 PROCEDURE — 86140 C-REACTIVE PROTEIN: CPT

## 2024-03-01 PROCEDURE — 80053 COMPREHEN METABOLIC PANEL: CPT

## 2024-03-01 PROCEDURE — 85652 RBC SED RATE AUTOMATED: CPT

## 2024-03-01 PROCEDURE — 85025 COMPLETE CBC W/AUTO DIFF WBC: CPT

## 2024-03-04 RX ORDER — ESTRADIOL 1 MG/1
1 TABLET ORAL DAILY
Qty: 30 TABLET | Refills: 3 | Status: SHIPPED | OUTPATIENT
Start: 2024-03-04

## 2024-03-04 RX ORDER — FOLIC ACID 1 MG/1
1000 TABLET ORAL DAILY
Qty: 30 TABLET | Refills: 0 | Status: SHIPPED | OUTPATIENT
Start: 2024-03-04

## 2024-03-04 RX ORDER — METHOTREXATE 2.5 MG/1
15 TABLET ORAL WEEKLY
Qty: 24 TABLET | Refills: 0 | Status: SHIPPED | OUTPATIENT
Start: 2024-03-04 | End: 2024-05-03

## 2024-03-04 NOTE — TELEPHONE ENCOUNTER
DOLV: 8/31/24  DONV: 3/14/24  LAST LAB DRAW: 3/1/24  LAST TB TEST: 10/17/22    Lab Results   Component Value Date     03/01/2024    K 4.1 03/01/2024     03/01/2024    CO2 26 03/01/2024    BUN 12 03/01/2024    CREATININE 0.8 03/01/2024    GLUCOSE 94 03/01/2024    CALCIUM 8.8 03/01/2024    PROT 6.8 03/01/2024    LABALBU 4.0 03/01/2024    BILITOT 0.4 03/01/2024    ALKPHOS 107 (H) 03/01/2024    AST 16 03/01/2024    ALT 16 03/01/2024    LABGLOM >60 03/01/2024    GFRAA >60 02/15/2022    AGRATIO 1.4 03/01/2024       Recent Labs     03/01/24  1203   WBC 5.9   HGB 13.2   HCT 38.1   MCV 92.5          Lab Results   Component Value Date    SEDRATE 5 03/01/2024       Lab Results   Component Value Date    CRP 10.7 (H) 03/01/2024

## 2024-03-14 ENCOUNTER — OFFICE VISIT (OUTPATIENT)
Dept: RHEUMATOLOGY | Age: 49
End: 2024-03-14
Payer: COMMERCIAL

## 2024-03-14 VITALS
OXYGEN SATURATION: 99 % | SYSTOLIC BLOOD PRESSURE: 110 MMHG | WEIGHT: 274 LBS | BODY MASS INDEX: 41.52 KG/M2 | HEART RATE: 74 BPM | HEIGHT: 68 IN | DIASTOLIC BLOOD PRESSURE: 70 MMHG

## 2024-03-14 DIAGNOSIS — Z79.899 ENCOUNTER FOR LONG-TERM (CURRENT) USE OF HIGH-RISK MEDICATION: ICD-10-CM

## 2024-03-14 DIAGNOSIS — M79.671 RIGHT FOOT PAIN: ICD-10-CM

## 2024-03-14 DIAGNOSIS — L40.50 PSORIATIC ARTHRITIS (HCC): Primary | ICD-10-CM

## 2024-03-14 PROCEDURE — G8417 CALC BMI ABV UP PARAM F/U: HCPCS | Performed by: INTERNAL MEDICINE

## 2024-03-14 PROCEDURE — G8427 DOCREV CUR MEDS BY ELIG CLIN: HCPCS | Performed by: INTERNAL MEDICINE

## 2024-03-14 PROCEDURE — 99214 OFFICE O/P EST MOD 30 MIN: CPT | Performed by: INTERNAL MEDICINE

## 2024-03-14 PROCEDURE — 1036F TOBACCO NON-USER: CPT | Performed by: INTERNAL MEDICINE

## 2024-03-14 PROCEDURE — G8484 FLU IMMUNIZE NO ADMIN: HCPCS | Performed by: INTERNAL MEDICINE

## 2024-03-14 RX ORDER — PREDNISONE 10 MG/1
TABLET ORAL
Qty: 18 TABLET | Refills: 0 | Status: SHIPPED | OUTPATIENT
Start: 2024-03-14 | End: 2024-03-23

## 2024-03-14 RX ORDER — METHOTREXATE 2.5 MG/1
20 TABLET ORAL WEEKLY
Qty: 32 TABLET | Refills: 2 | Status: SHIPPED | OUTPATIENT
Start: 2024-03-14 | End: 2024-06-12

## 2024-03-14 RX ORDER — FOLIC ACID 1 MG/1
1000 TABLET ORAL DAILY
Qty: 30 TABLET | Refills: 5 | Status: SHIPPED | OUTPATIENT
Start: 2024-03-14

## 2024-03-14 ASSESSMENT — ENCOUNTER SYMPTOMS
BLOOD IN STOOL: 0
VOMITING: 0
NAUSEA: 0
SHORTNESS OF BREATH: 0
ABDOMINAL PAIN: 0
COUGH: 0

## 2024-03-14 NOTE — PROGRESS NOTES
[facility]   Rheumatology Clinic Note      3/14/2024       CHIEF COMPLAINT:    Chief Complaint   Patient presents with    Follow-up     Follow up psoriatic arthritis  She is having pain in bilateral knees, ankles, feet. She has stiffness in bilateral elbows and right wrist.   She is having edema in right ankle. Her whole right leg can swell after working. She can have minimal swelling in left leg.            HISTORY OF PRESENT ILLNESS:    Last seen in August 31, 2023.  48 y.o. female with psoriatic arthritis and plaque psoriasis presents for follow up. She is on Cosentyx 300 mg SQ (started Feb 2023, managed by dermatology), methotrexate 15 mg PO weekly (started last visit) and folic acid.     Past med: Tremfya (allergic), Taltz (side effects), Humira (loss of effective), Enbrel (loss of effectiveness), Otezla (side effects), methotrexate (was initially started for skin, not joints).    Reports that initially she noticed improvement in her joint pain when she initially started methotrexate. However, she started a new job 2 months ago. She walks over 6 miles at her job. Notices worsening pain.  Towards the work shift, her pain worsen significantly.  Pain and swelling ankles, worse in the right. Swelling in both ankles, but is worse on her right lower extremity.  Pain in the feet, knees, hips, elbows, wrists.    Morning stiffness lasts for an hour.      RECAP:  Diagnosed with psoriasis around 20 years ago.    Joint pain started around 10 years ago. This has progressively worsen in last 6 years. This significantly worsen after her hystrectomy in Jan 2021 (and oophorectomy).    Pain is mainly in her hips, buttock region, knees, ankles and feet.  Worsen recently with weight gain.  Has prolonged mroning stiffness that lasts for at least 30 minutes.  Swelling in ankles and knees.    Allergic Aleve.  Ibuprofen worsen abdominal pain.    H/o iritis around 2-3 years ago. Was following with ophthalmology and was

## 2024-03-15 LAB
ALBUMIN SERPL-MCNC: 3.9 G/DL
ALP BLD-CCNC: 94 U/L
ALT SERPL-CCNC: 39 U/L
ANION GAP SERPL CALCULATED.3IONS-SCNC: 9 MMOL/L
AST SERPL-CCNC: 22 U/L
BASOPHILS ABSOLUTE: 0.1 /ΜL
BASOPHILS RELATIVE PERCENT: 1 %
BILIRUB SERPL-MCNC: 0.5 MG/DL (ref 0.1–1.4)
BUN BLDV-MCNC: 12 MG/DL
C-REACTIVE PROTEIN: 0.7
CALCIUM SERPL-MCNC: 9.3 MG/DL
CHLORIDE BLD-SCNC: 102 MMOL/L
CO2: 30 MMOL/L
CREAT SERPL-MCNC: 0.83 MG/DL
EGFR: 87
EOSINOPHILS ABSOLUTE: 0.2 /ΜL
EOSINOPHILS RELATIVE PERCENT: 2.8 %
GLUCOSE BLD-MCNC: 94 MG/DL
HCT VFR BLD CALC: 39.1 % (ref 36–46)
HEMOGLOBIN: 13.6 G/DL (ref 12–16)
LYMPHOCYTES ABSOLUTE: 1.7 /ΜL
LYMPHOCYTES RELATIVE PERCENT: 27.9 %
MCH RBC QN AUTO: 32.5 PG
MCHC RBC AUTO-ENTMCNC: 34.7 G/DL
MCV RBC AUTO: 94 FL
MONOCYTES ABSOLUTE: 0.4 /ΜL
MONOCYTES RELATIVE PERCENT: 7.3 %
NEUTROPHILS ABSOLUTE: 3.7 /ΜL
NEUTROPHILS RELATIVE PERCENT: 61 %
PDW BLD-RTO: 13.4 %
PLATELET # BLD: 232 K/ΜL
PMV BLD AUTO: 10.6 FL
POTASSIUM SERPL-SCNC: 3.8 MMOL/L
QUANTI TB GOLD PLUS: NEGATIVE
QUANTI TB1 MINUS NIL: 0
QUANTI TB2 MINUS NIL: 0
QUANTIFERON MITOGEN: 9.97
QUANTIFERON NIL: 0.03
RBC # BLD: 4.17 10^6/ΜL
SEDIMENTATION RATE, ERYTHROCYTE: 11
SODIUM BLD-SCNC: 141 MMOL/L
TOTAL PROTEIN: 6.9
WBC # BLD: 6.1 10^3/ML

## 2024-03-28 RX ORDER — OXYBUTYNIN CHLORIDE 10 MG/1
10 TABLET, EXTENDED RELEASE ORAL DAILY
Qty: 30 TABLET | Refills: 3 | Status: SHIPPED | OUTPATIENT
Start: 2024-03-28

## 2024-06-27 RX ORDER — ESTRADIOL 1 MG/1
1 TABLET ORAL DAILY
Qty: 30 TABLET | Refills: 0 | Status: SHIPPED | OUTPATIENT
Start: 2024-06-27

## 2024-07-15 ENCOUNTER — OFFICE VISIT (OUTPATIENT)
Dept: OBGYN CLINIC | Age: 49
End: 2024-07-15
Payer: COMMERCIAL

## 2024-07-15 VITALS
WEIGHT: 269 LBS | HEIGHT: 68 IN | SYSTOLIC BLOOD PRESSURE: 112 MMHG | BODY MASS INDEX: 40.77 KG/M2 | DIASTOLIC BLOOD PRESSURE: 78 MMHG

## 2024-07-15 DIAGNOSIS — Z12.31 ENCOUNTER FOR SCREENING MAMMOGRAM FOR MALIGNANT NEOPLASM OF BREAST: ICD-10-CM

## 2024-07-15 DIAGNOSIS — Z12.11 SCREEN FOR COLON CANCER: ICD-10-CM

## 2024-07-15 DIAGNOSIS — Z01.419 SMEAR, VAGINAL, AS PART OF ROUTINE GYNECOLOGICAL EXAMINATION: Primary | ICD-10-CM

## 2024-07-15 DIAGNOSIS — Z12.72 SMEAR, VAGINAL, AS PART OF ROUTINE GYNECOLOGICAL EXAMINATION: Primary | ICD-10-CM

## 2024-07-15 PROCEDURE — 99396 PREV VISIT EST AGE 40-64: CPT | Performed by: ADVANCED PRACTICE MIDWIFE

## 2024-07-15 ASSESSMENT — ENCOUNTER SYMPTOMS
GASTROINTESTINAL NEGATIVE: 1
SHORTNESS OF BREATH: 0
ABDOMINAL PAIN: 0
RESPIRATORY NEGATIVE: 1
CONSTIPATION: 0
DIARRHEA: 0

## 2024-07-15 NOTE — PROGRESS NOTES
Chaperone for Intimate Exam  Chaperone was offered as part of the rooming process. Patient declined and agrees to continue with exam without a chaperone.       
tenderness.   Neurological:      Mental Status: She is alert and oriented to person, place, and time.   Skin:     General: Skin is warm and dry.   Psychiatric:         Behavior: Behavior normal.   Vitals and nursing note reviewed. Chaperone present: Declined.                                 Assessment and Plan          Diagnosis Orders   1. Smear, vaginal, as part of routine gynecological examination        2. Screen for colon cancer  Fecal DNA Colorectal cancer screening (Cologuard)      3. Encounter for screening mammogram for malignant neoplasm of breast  SONDRA DIGITAL SCREEN W OR WO CAD BILATERAL          Repeat Annual every 1 year  Cervical Cytology Evaluation begins at 21 years old.  If Negative Cytology, Follow-up screening per current guidelines.    Mammograms every 1year. If 41 yo and last mammogram was negative.  Calcium and Vitamin D dosing reviewed.  Birth control and barrier recommendationsdiscussed.  STD counseling and prevention reviewed.  Routine healthmaintenance per patients PCP.    Will send cologuard order     Will have mammogram at [facility] - patient going to schedule via [facility]      I am having Lisa Wilson maintain her cetirizine, esomeprazole, EPINEPHrine, Cosentyx Sensoready (300 MG), progesterone, ketorolac, methotrexate, folic acid, oxyBUTYnin, and estradiol.    Return in about 1 year (around 7/15/2025) for Yearly.    She was also counseled on her preventative health maintenance recommendations and follow-up.     There are no Patient Instructions on file for this visit.    JONEL COHN CNM,7/15/2024 1:45 PM

## 2024-07-31 RX ORDER — OXYBUTYNIN CHLORIDE 10 MG/1
10 TABLET, EXTENDED RELEASE ORAL DAILY
Qty: 30 TABLET | Refills: 0 | OUTPATIENT
Start: 2024-07-31

## 2024-07-31 RX ORDER — PROGESTERONE 100 MG/1
100 CAPSULE ORAL NIGHTLY
Qty: 30 CAPSULE | Refills: 0 | OUTPATIENT
Start: 2024-07-31

## 2024-07-31 RX ORDER — PROGESTERONE 100 MG/1
100 CAPSULE ORAL NIGHTLY
Qty: 30 CAPSULE | Refills: 0 | Status: SHIPPED | OUTPATIENT
Start: 2024-07-31

## 2024-07-31 RX ORDER — OXYBUTYNIN CHLORIDE 10 MG/1
10 TABLET, EXTENDED RELEASE ORAL DAILY
Qty: 30 TABLET | Refills: 0 | Status: SHIPPED | OUTPATIENT
Start: 2024-07-31

## 2024-07-31 RX ORDER — ESTRADIOL 1 MG/1
1 TABLET ORAL DAILY
Qty: 30 TABLET | Refills: 0 | Status: SHIPPED | OUTPATIENT
Start: 2024-07-31

## 2024-07-31 RX ORDER — ESTRADIOL 1 MG/1
1 TABLET ORAL DAILY
Qty: 30 TABLET | Refills: 0 | OUTPATIENT
Start: 2024-07-31

## 2024-08-09 ENCOUNTER — HOSPITAL ENCOUNTER (OUTPATIENT)
Dept: MAMMOGRAPHY | Age: 49
End: 2024-08-09
Payer: COMMERCIAL

## 2024-08-09 VITALS — HEIGHT: 66 IN | WEIGHT: 259 LBS | BODY MASS INDEX: 41.62 KG/M2

## 2024-08-09 DIAGNOSIS — Z12.31 ENCOUNTER FOR SCREENING MAMMOGRAM FOR MALIGNANT NEOPLASM OF BREAST: ICD-10-CM

## 2024-08-09 PROCEDURE — 77063 BREAST TOMOSYNTHESIS BI: CPT

## 2024-08-22 LAB — NONINV COLON CA DNA+OCC BLD SCRN STL QL: NEGATIVE

## 2024-09-03 RX ORDER — ESTRADIOL 1 MG/1
1 TABLET ORAL DAILY
Qty: 30 TABLET | Refills: 0 | OUTPATIENT
Start: 2024-09-03

## 2024-09-03 RX ORDER — PROGESTERONE 100 MG/1
100 CAPSULE ORAL NIGHTLY
Qty: 30 CAPSULE | Refills: 11 | Status: SHIPPED | OUTPATIENT
Start: 2024-09-03

## 2024-09-03 RX ORDER — ESTRADIOL 1 MG/1
1 TABLET ORAL DAILY
Qty: 30 TABLET | Refills: 11 | Status: SHIPPED | OUTPATIENT
Start: 2024-09-03

## 2024-09-03 RX ORDER — OXYBUTYNIN CHLORIDE 10 MG/1
10 TABLET, EXTENDED RELEASE ORAL DAILY
Qty: 30 TABLET | Refills: 0 | OUTPATIENT
Start: 2024-09-03

## 2024-09-03 RX ORDER — OXYBUTYNIN CHLORIDE 10 MG/1
10 TABLET, EXTENDED RELEASE ORAL DAILY
Qty: 30 TABLET | Refills: 11 | Status: SHIPPED | OUTPATIENT
Start: 2024-09-03

## 2024-09-03 RX ORDER — PROGESTERONE 100 MG/1
100 CAPSULE ORAL NIGHTLY
Qty: 30 CAPSULE | Refills: 0 | OUTPATIENT
Start: 2024-09-03

## 2024-09-03 NOTE — TELEPHONE ENCOUNTER
Patient was in for an annual 7/2024, but does not appear that any refills were sent in.  Bill approved refills while you were out of the office, but only gave one month.  Ok to refill for one year?

## 2024-12-10 ENCOUNTER — OFFICE VISIT (OUTPATIENT)
Dept: PRIMARY CARE CLINIC | Age: 49
End: 2024-12-10
Payer: COMMERCIAL

## 2024-12-10 VITALS
SYSTOLIC BLOOD PRESSURE: 126 MMHG | BODY MASS INDEX: 42.62 KG/M2 | HEART RATE: 88 BPM | HEIGHT: 66 IN | WEIGHT: 265.2 LBS | OXYGEN SATURATION: 96 % | TEMPERATURE: 97.3 F | DIASTOLIC BLOOD PRESSURE: 66 MMHG

## 2024-12-10 DIAGNOSIS — B96.89 BACTERIAL URI: Primary | ICD-10-CM

## 2024-12-10 DIAGNOSIS — J06.9 BACTERIAL URI: Primary | ICD-10-CM

## 2024-12-10 DIAGNOSIS — R11.0 NAUSEA: ICD-10-CM

## 2024-12-10 PROCEDURE — G8484 FLU IMMUNIZE NO ADMIN: HCPCS

## 2024-12-10 PROCEDURE — 99203 OFFICE O/P NEW LOW 30 MIN: CPT

## 2024-12-10 PROCEDURE — G8417 CALC BMI ABV UP PARAM F/U: HCPCS

## 2024-12-10 PROCEDURE — 1036F TOBACCO NON-USER: CPT

## 2024-12-10 PROCEDURE — G8427 DOCREV CUR MEDS BY ELIG CLIN: HCPCS

## 2024-12-10 PROCEDURE — 99213 OFFICE O/P EST LOW 20 MIN: CPT

## 2024-12-10 RX ORDER — ONDANSETRON 4 MG/1
4 TABLET, ORALLY DISINTEGRATING ORAL 3 TIMES DAILY PRN
Qty: 21 TABLET | Refills: 0 | Status: SHIPPED | OUTPATIENT
Start: 2024-12-10

## 2024-12-10 RX ORDER — DOXYCYCLINE HYCLATE 100 MG
100 TABLET ORAL 2 TIMES DAILY
Qty: 14 TABLET | Refills: 0 | Status: SHIPPED | OUTPATIENT
Start: 2024-12-10 | End: 2024-12-17

## 2024-12-10 ASSESSMENT — ENCOUNTER SYMPTOMS
NAUSEA: 1
SORE THROAT: 1
DIARRHEA: 0
VOMITING: 1
RHINORRHEA: 0
ABDOMINAL PAIN: 1
COUGH: 1
SINUS PAIN: 0
SHORTNESS OF BREATH: 0

## 2024-12-10 NOTE — PROGRESS NOTES
[facility] Walk In department of [facility]  [address] [facility] [address]  Phone: [phone]  Fax: [phone]      Lisa Wilson  1975  MRN: [id number]  Date of visit: 12/10/2024    Chief Complaint:     Lisa Wilson is here for c/o of Pharyngitis (Sx started a week ago. ), Nausea, and Cough      HPI:     Lisa Wilson is a 49 y.o. female who presents to the [facility] today for her medical conditions/complaints as noted below.    Cold Symptoms   This is a new problem. Episode onset: 1.5 weeks. The problem has been gradually worsening. There has been no fever. Associated symptoms include abdominal pain, coughing, nausea, sneezing, a sore throat and vomiting (x2). Pertinent negatives include no chest pain, congestion, diarrhea, ear pain, headaches, plugged ear sensation, rhinorrhea or sinus pain. Associated symptoms comments: Hoarse voice. She has tried NSAIDs and acetaminophen (dayquil) for the symptoms. The treatment provided mild relief.   Sick contacts    Past Medical History:   Diagnosis Date    Abnormal Pap smear of cervix     Active smoker     Anxiety     Asthma     Asthma     Balance problem     Bronchitis     Cervical radicular pain     CTS (carpal tunnel syndrome)     Depressive disorder     Fall at home     Headache     Migraine     Multiple allergies     Neck pain     Peripheral neuropathy     Pneumonia     Psoriasis         Allergies   Allergen Reactions    Tremfya [Guselkumab] Anaphylaxis and Hives     Shock, body aches    Dextromethorphan-Guaifenesin Rash     Other reaction(s): Dizziness    Aleve [Naproxen] Hives     Gets hives from Aleve and Excedrin migraine or any migraine medications but can take other naproxen products    Bee Venom Swelling    Ciprofloxacin Hives    Darvocet A500 [Propoxyphene N-Acetaminophen]      Hallucinate and sleep     Keflex [Cephalexin] Hives    Pcn [Penicillins] Hives    Zithromax [Azithromycin] Hives

## 2024-12-10 NOTE — PATIENT INSTRUCTIONS
Complete full course of antibiotics  Continue with mucinex and nasal sprays  May use a kvng pot or saline rinses as tolerated  May use tylenol for headaches  Increase water intake  If symptoms worsen follow up with PCP  Patient verbalized understanding and agrees with plan of care

## 2025-03-12 ENCOUNTER — OFFICE VISIT (OUTPATIENT)
Dept: OBGYN CLINIC | Age: 50
End: 2025-03-12
Payer: COMMERCIAL

## 2025-03-12 VITALS
DIASTOLIC BLOOD PRESSURE: 74 MMHG | BODY MASS INDEX: 42.43 KG/M2 | HEIGHT: 66 IN | WEIGHT: 264 LBS | SYSTOLIC BLOOD PRESSURE: 110 MMHG

## 2025-03-12 DIAGNOSIS — R45.86 MOOD CHANGE: ICD-10-CM

## 2025-03-12 DIAGNOSIS — Z79.899 MEDICATION COURSE CHANGED: ICD-10-CM

## 2025-03-12 DIAGNOSIS — R41.89 BRAIN FOG: ICD-10-CM

## 2025-03-12 DIAGNOSIS — R61 NIGHT SWEAT: Primary | ICD-10-CM

## 2025-03-12 DIAGNOSIS — N95.1 MENOPAUSE SYNDROME: ICD-10-CM

## 2025-03-12 DIAGNOSIS — R23.2 HOT FLASHES: ICD-10-CM

## 2025-03-12 PROCEDURE — 99214 OFFICE O/P EST MOD 30 MIN: CPT | Performed by: ADVANCED PRACTICE MIDWIFE

## 2025-03-12 PROCEDURE — G8427 DOCREV CUR MEDS BY ELIG CLIN: HCPCS | Performed by: ADVANCED PRACTICE MIDWIFE

## 2025-03-12 PROCEDURE — 1036F TOBACCO NON-USER: CPT | Performed by: ADVANCED PRACTICE MIDWIFE

## 2025-03-12 PROCEDURE — G8417 CALC BMI ABV UP PARAM F/U: HCPCS | Performed by: ADVANCED PRACTICE MIDWIFE

## 2025-03-12 RX ORDER — PROMETHAZINE HYDROCHLORIDE 25 MG/1
25 TABLET ORAL EVERY 6 HOURS PRN
COMMUNITY
Start: 2025-02-06

## 2025-03-12 RX ORDER — ESTERIFIED ESTROGENS, METHYLTESTOSTERONE .625; 1.25 MG/1; MG/1
1 TABLET ORAL DAILY
Qty: 21 TABLET | Refills: 3 | Status: SHIPPED | OUTPATIENT
Start: 2025-03-12

## 2025-03-12 ASSESSMENT — ENCOUNTER SYMPTOMS
GASTROINTESTINAL NEGATIVE: 1
RESPIRATORY NEGATIVE: 1

## 2025-03-12 NOTE — PROGRESS NOTES
PROBLEM VISIT     Date of service: 3/12/2025    Lisa Wilson  Is a 49 y.o.  female    PT's PCP is: Nannette Blair, APRN - CNP     : 1975                                          HPI Here to discuss current s/s.  Reports having more vasomotor s/s (hot flashes and night sweats), brain fog, and mood changes.  In  was on Estratest (initially rx by Dr Liz) and did well but due to insurance coverage had to stop medication and therefore s/s increased again.   Was then started on estrace and prometrium PO and was doing well initially but now s/s are increasing.  Brain fog \"really bad now\".  Night sweats and hot flashes were doing better and brain fog was better but when vasomotor increased so did brain fog.   Watching diet and exercising.  States \"I just feel miserable\".   PCP in [place] and they recommended she call our office for f/u.   Would really like to try estratest again. Feels that worked better for her.      Review of Systems   Constitutional:  Positive for fatigue.   HENT: Negative.     Respiratory: Negative.     Gastrointestinal: Negative.    Endocrine:        Hot flashes/night sweats   Neurological:         \"Brain fog, people will be talking to me but I just don't hear them and basically look right through them\".        Patient's last menstrual period was 2020.   OB History    Para Term  AB Living   3 3 3 0 0 3   SAB IAB Ectopic Molar Multiple Live Births        3      # Outcome Date GA Lbr Jimbo/2nd Weight Sex Type Anes PTL Lv   3 Term            2 Term            1 Term                 Social History     Tobacco Use   Smoking Status Former    Current packs/day: 0.00    Average packs/day: 1 pack/day for 10.0 years (10.0 ttl pk-yrs)    Types: Cigarettes    Start date: 2012    Quit date: 2022    Years since quitting: 3.1   Smokeless Tobacco Never        Social History     Substance and Sexual Activity   Alcohol Use Not Currently    Comment: rarely

## 2025-06-11 ASSESSMENT — ENCOUNTER SYMPTOMS: RESPIRATORY NEGATIVE: 1

## 2025-06-11 NOTE — PROGRESS NOTES
PROBLEM VISIT     Date of service: 2025    Lisa Wilson  Is a 49 y.o.  female    PT's PCP is: Nannette Blair, APRN - CNP     : 1975                                          HPI Here to follow up since starting Estratest.  Previously was on Estrace and Prometrium and was having a lot of breakthrough menopausal symptoms.  Had done well on estratest in the past so wanted to restart this.  Since last visit reports doing well.  Brain fog \"hardly at all now\".  Mood swings greatly improved.  Mood swings are less intense.  Night sweats much less frequent - used to be every few hours waking drenched and far less frequent and intense now.    Stomach \"been screwed up for months and months\" - if does not take probiotics \"things are worse\" - does not eat often and struggling to drop weight, stays active.  On anti nausea medications. Has had multiple upper GI and lower GI scopes done. Has seen PCP and gen surgeon, not seen GI in the past.  Has been an issue for 9ish months.  Has seen dietician and \"cannot eat as much they wanted me to eat\".  Takes Nexium daily    Review of Systems   Constitutional:  Positive for unexpected weight change (slight gain, struggling to lose weight).   HENT: Negative.     Respiratory: Negative.     Cardiovascular: Negative.    Gastrointestinal:  Positive for nausea and vomiting.   Genitourinary: Negative.    Neurological: Negative.    Psychiatric/Behavioral: Negative.         Patient's last menstrual period was 2020.   OB History    Para Term  AB Living   3 3 3 0 0 3   SAB IAB Ectopic Molar Multiple Live Births        3      # Outcome Date GA Lbr Jimbo/2nd Weight Sex Type Anes PTL Lv   3 Term            2 Term            1 Term                 Social History     Tobacco Use   Smoking Status Former    Current packs/day: 0.00    Average packs/day: 1 pack/day for 10.0 years (10.0 ttl pk-yrs)    Types: Cigarettes    Start date: 2012    Quit date: 2022

## 2025-06-12 ENCOUNTER — OFFICE VISIT (OUTPATIENT)
Dept: OBGYN CLINIC | Age: 50
End: 2025-06-12
Payer: COMMERCIAL

## 2025-06-12 VITALS
SYSTOLIC BLOOD PRESSURE: 122 MMHG | HEIGHT: 66 IN | BODY MASS INDEX: 42.75 KG/M2 | WEIGHT: 266 LBS | DIASTOLIC BLOOD PRESSURE: 80 MMHG

## 2025-06-12 DIAGNOSIS — R45.86 MOOD CHANGE: ICD-10-CM

## 2025-06-12 DIAGNOSIS — R23.2 HOT FLASHES: ICD-10-CM

## 2025-06-12 DIAGNOSIS — R11.2 NAUSEA AND VOMITING, UNSPECIFIED VOMITING TYPE: ICD-10-CM

## 2025-06-12 DIAGNOSIS — N95.1 MENOPAUSE SYNDROME: ICD-10-CM

## 2025-06-12 DIAGNOSIS — R41.89 BRAIN FOG: ICD-10-CM

## 2025-06-12 DIAGNOSIS — Z79.899 FOLLOW-UP ENCOUNTER INVOLVING MEDICATION: Primary | ICD-10-CM

## 2025-06-12 DIAGNOSIS — R61 NIGHT SWEAT: ICD-10-CM

## 2025-06-12 PROCEDURE — 1036F TOBACCO NON-USER: CPT | Performed by: ADVANCED PRACTICE MIDWIFE

## 2025-06-12 PROCEDURE — 99213 OFFICE O/P EST LOW 20 MIN: CPT | Performed by: ADVANCED PRACTICE MIDWIFE

## 2025-06-12 PROCEDURE — G8417 CALC BMI ABV UP PARAM F/U: HCPCS | Performed by: ADVANCED PRACTICE MIDWIFE

## 2025-06-12 PROCEDURE — G8427 DOCREV CUR MEDS BY ELIG CLIN: HCPCS | Performed by: ADVANCED PRACTICE MIDWIFE

## 2025-06-12 RX ORDER — ESTERIFIED ESTROGENS, METHYLTESTOSTERONE .625; 1.25 MG/1; MG/1
1 TABLET ORAL DAILY
Qty: 21 TABLET | Refills: 1 | Status: SHIPPED | OUTPATIENT
Start: 2025-06-12

## 2025-06-12 ASSESSMENT — ENCOUNTER SYMPTOMS
VOMITING: 1
NAUSEA: 1

## 2025-07-29 DIAGNOSIS — R61 NIGHT SWEAT: ICD-10-CM

## 2025-07-29 DIAGNOSIS — R23.2 HOT FLASHES: ICD-10-CM

## 2025-07-29 DIAGNOSIS — R41.89 BRAIN FOG: ICD-10-CM

## 2025-07-29 DIAGNOSIS — N95.1 MENOPAUSE SYNDROME: ICD-10-CM

## 2025-07-29 DIAGNOSIS — R45.86 MOOD CHANGE: ICD-10-CM

## 2025-07-29 RX ORDER — ESTERIFIED ESTROGENS, METHYLTESTOSTERONE .625; 1.25 MG/1; MG/1
1 TABLET ORAL DAILY
Qty: 21 TABLET | Refills: 0 | Status: SHIPPED | OUTPATIENT
Start: 2025-07-29

## 2025-07-29 RX ORDER — ESTERIFIED ESTROGENS, METHYLTESTOSTERONE .625; 1.25 MG/1; MG/1
1 TABLET ORAL DAILY
Qty: 21 TABLET | Refills: 1 | OUTPATIENT
Start: 2025-07-29

## 2025-08-26 ENCOUNTER — TELEPHONE (OUTPATIENT)
Dept: OBGYN CLINIC | Age: 50
End: 2025-08-26

## 2025-08-28 ENCOUNTER — OFFICE VISIT (OUTPATIENT)
Dept: OBGYN CLINIC | Age: 50
End: 2025-08-28
Payer: COMMERCIAL

## 2025-08-28 VITALS
HEIGHT: 66 IN | DIASTOLIC BLOOD PRESSURE: 76 MMHG | SYSTOLIC BLOOD PRESSURE: 110 MMHG | BODY MASS INDEX: 42.75 KG/M2 | WEIGHT: 266 LBS

## 2025-08-28 DIAGNOSIS — Z01.419 VISIT FOR GYNECOLOGIC EXAMINATION: Primary | ICD-10-CM

## 2025-08-28 DIAGNOSIS — N95.1 MENOPAUSE SYNDROME: ICD-10-CM

## 2025-08-28 PROCEDURE — 99396 PREV VISIT EST AGE 40-64: CPT | Performed by: ADVANCED PRACTICE MIDWIFE

## 2025-08-28 RX ORDER — PROGESTERONE 100 MG/1
100 CAPSULE ORAL NIGHTLY
Qty: 30 CAPSULE | Refills: 11 | Status: SHIPPED | OUTPATIENT
Start: 2025-08-28

## 2025-08-28 RX ORDER — ESTERIFIED ESTROGENS, METHYLTESTOSTERONE .625; 1.25 MG/1; MG/1
1 TABLET ORAL DAILY
Qty: 21 TABLET | Refills: 5 | Status: SHIPPED | OUTPATIENT
Start: 2025-08-28

## 2025-08-28 RX ORDER — OXYBUTYNIN CHLORIDE 10 MG/1
10 TABLET, EXTENDED RELEASE ORAL DAILY
Qty: 30 TABLET | Refills: 11 | Status: SHIPPED | OUTPATIENT
Start: 2025-08-28

## 2025-08-28 ASSESSMENT — ENCOUNTER SYMPTOMS
ABDOMINAL PAIN: 0
GASTROINTESTINAL NEGATIVE: 1
CONSTIPATION: 0
DIARRHEA: 0
SHORTNESS OF BREATH: 0
RESPIRATORY NEGATIVE: 1

## (undated) DEVICE — DRESSING SURESITE-123PLUSPAD 2.4 IN X2.8 IN

## (undated) DEVICE — SYRINGE MED 20ML STD CLR PLAS LUERLOCK TIP N CTRL DISP

## (undated) DEVICE — SUTURE VCRL + SZ 3-0 L27IN ABSRB UD L26MM SH 1/2 CIR VCP416H

## (undated) DEVICE — COVER LT HNDL BLU PLAS

## (undated) DEVICE — SET UP: Brand: MEDLINE INDUSTRIES, INC.

## (undated) DEVICE — MATERNITY KNIT PANTS,SEAMLESS: Brand: WINGS

## (undated) DEVICE — Device

## (undated) DEVICE — GLOVE SURG SZ 65 L12IN FNGR THK87MIL WHT LTX FREE

## (undated) DEVICE — SUTURE V-LOC 90 3-0 L9IN ABSRB VLT L26MM V-20 1/2 CIR TAPR VLOCM0644

## (undated) DEVICE — GLOVE ORANGE PI 7   MSG9070

## (undated) DEVICE — GOWN,AURORA,NONREINFORCED,LARGE: Brand: MEDLINE

## (undated) DEVICE — SHEET,DRAPE,53X77,STERILE: Brand: MEDLINE

## (undated) DEVICE — LAVH-LF: Brand: MEDLINE INDUSTRIES, INC.

## (undated) DEVICE — TIP COVER ACCESSORY

## (undated) DEVICE — SOLUTION IV IRRIG POUR BRL 0.9% SODIUM CHL 2F7124

## (undated) DEVICE — GLOVE ORANGE PI 8   MSG9080

## (undated) DEVICE — SYRINGE MED 10ML LUERLOCK TIP W/O SFTY DISP

## (undated) DEVICE — NEEDLE INSUF 14GA L150MM LNG DISP FOR ENDOSCP ACCS

## (undated) DEVICE — MAGNETIC IMPLANT S1000-00: Brand: SOPHONO™

## (undated) DEVICE — SUTURE V-LOC 180 SZ 0 L9IN ABSRB GRN GS-21 L37MM 1/2 CIR VLOCL0346

## (undated) DEVICE — STRIP,CLOSURE,WOUND,MEDI-STRIP,1/2X4: Brand: MEDLINE

## (undated) DEVICE — SOLUTION SURG PREP ANTIMICROBIAL 4 OZ SKIN WND EXIDINE

## (undated) DEVICE — TROCAR ENDOSCP L150MM DIA12MM LNG CANN DIL BLDELSS BLNT TIP

## (undated) DEVICE — CANNULA SEAL

## (undated) DEVICE — 40586 ADVANCED TRENDELENBURG POSITIONING KIT: Brand: 40586 ADVANCED TRENDELENBURG POSITIONING KIT

## (undated) DEVICE — SOLUTION IV 1000ML 0.9% SOD CHL FOR IRRIG PLAS CONT

## (undated) DEVICE — Z DISCONTINUED BY MEDLINE USE 2711682 TRAY SKIN PREP DRY W/ PREM GLV

## (undated) DEVICE — TOTAL TRAY, DB, 100% SILI FOLEY, 16FR 10: Brand: MEDLINE

## (undated) DEVICE — ELECTRO LUBE IS A SINGLE PATIENT USE DEVICE THAT IS INTENDED TO BE USED ON ELECTROSURGICAL ELECTRODES TO REDUCE STICKING.: Brand: KEY SURGICAL ELECTRO LUBE

## (undated) DEVICE — WARMER SCP 2 ANTIFOG LAP DISP

## (undated) DEVICE — BLADELESS OBTURATOR: Brand: WECK VISTA

## (undated) DEVICE — MASTISOL ADHESIVE LIQ 2/3ML

## (undated) DEVICE — SPONGE LAP W18XL18IN WHT COT 4 PLY FLD STRUNG RADPQ DISP ST

## (undated) DEVICE — TROCAR ENDOSCP L110MM DIA11MM STD CANN DIL BLDELSS BLNT TIP

## (undated) DEVICE — [HIGH FLOW INSUFFLATOR,  DO NOT USE IF PACKAGE IS DAMAGED,  KEEP DRY,  KEEP AWAY FROM SUNLIGHT,  PROTECT FROM HEAT AND RADIOACTIVE SOURCES.]: Brand: PNEUMOSURE

## (undated) DEVICE — SUTURE MCRYL SZ 4-0 L27IN ABSRB UD L19MM PS-2 1/2 CIR PRIM Y426H

## (undated) DEVICE — Device: Brand: UTERINE ELEVATOR PRO

## (undated) DEVICE — ELECTRODE ES AD CRD L15FT DISP FOR PT BELOW 30LB REM

## (undated) DEVICE — SHEET, T, LAPAROTOMY, STERILE: Brand: MEDLINE

## (undated) DEVICE — DRAPE,UTILTY,TAPE,15X26, 4EA/PK: Brand: MEDLINE

## (undated) DEVICE — Z DISCONTINUED APPLICATOR SURG PREP 0.35OZ 2% CHG 70% ISO ALC W/ HI LT

## (undated) DEVICE — ARM DRAPE

## (undated) DEVICE — COVER,MAYO STAND,STERILE: Brand: MEDLINE

## (undated) DEVICE — SUTURE VCRL + SZ 4-0 L27IN ABSRB WHT FS-2 3/8 CIR REV CUT VCP422H

## (undated) DEVICE — PENCIL SMK EVAC ALL IN 1 DSGN ENH VISIBILITY IMPROVED AIR